# Patient Record
Sex: MALE | Race: WHITE | NOT HISPANIC OR LATINO | Employment: OTHER | URBAN - METROPOLITAN AREA
[De-identification: names, ages, dates, MRNs, and addresses within clinical notes are randomized per-mention and may not be internally consistent; named-entity substitution may affect disease eponyms.]

---

## 2023-12-27 PROBLEM — I25.10 CAD (CORONARY ARTERY DISEASE): Status: ACTIVE | Noted: 2023-12-27

## 2023-12-27 PROBLEM — I10 HTN (HYPERTENSION): Status: ACTIVE | Noted: 2023-12-27

## 2023-12-27 PROBLEM — E78.5 DYSLIPIDEMIA: Status: ACTIVE | Noted: 2023-12-27

## 2023-12-27 RX ORDER — MULTIVIT-MIN/IRON FUM/FOLIC AC 7.5 MG-4
1 TABLET ORAL DAILY
COMMUNITY

## 2023-12-27 RX ORDER — CALCIUM CARBONATE/VITAMIN D3 500-10/5ML
400 LIQUID (ML) ORAL
COMMUNITY
End: 2024-05-28 | Stop reason: WASHOUT

## 2023-12-27 RX ORDER — ASCORBIC ACID 500 MG
500 TABLET ORAL DAILY
COMMUNITY

## 2023-12-27 RX ORDER — ASPIRIN 81 MG/1
81 TABLET ORAL DAILY
COMMUNITY

## 2023-12-27 RX ORDER — ACETAMINOPHEN 500 MG
2000 TABLET ORAL
COMMUNITY
End: 2024-01-04 | Stop reason: WASHOUT

## 2023-12-27 RX ORDER — CALCIUM CARBONATE 500(1250)
1 TABLET ORAL
COMMUNITY

## 2023-12-27 RX ORDER — MULTIVITAMIN
TABLET ORAL
COMMUNITY
End: 2024-01-04 | Stop reason: WASHOUT

## 2023-12-27 RX ORDER — ATORVASTATIN CALCIUM 80 MG/1
80 TABLET, FILM COATED ORAL NIGHTLY
COMMUNITY
Start: 2019-07-17

## 2023-12-27 RX ORDER — PNV NO.95/FERROUS FUM/FOLIC AC 28MG-0.8MG
100 TABLET ORAL
COMMUNITY

## 2023-12-27 RX ORDER — METOPROLOL TARTRATE 25 MG/1
25 TABLET, FILM COATED ORAL 2 TIMES DAILY
COMMUNITY
End: 2024-01-04 | Stop reason: WASHOUT

## 2023-12-27 RX ORDER — VIT C/E/ZN/COPPR/LUTEIN/ZEAXAN 250MG-90MG
25 CAPSULE ORAL
COMMUNITY

## 2024-01-04 ENCOUNTER — OFFICE VISIT (OUTPATIENT)
Dept: CARDIOLOGY | Facility: CLINIC | Age: 74
End: 2024-01-04
Payer: MEDICARE

## 2024-01-04 VITALS
HEART RATE: 76 BPM | OXYGEN SATURATION: 97 % | DIASTOLIC BLOOD PRESSURE: 82 MMHG | BODY MASS INDEX: 45.1 KG/M2 | HEIGHT: 70 IN | SYSTOLIC BLOOD PRESSURE: 126 MMHG | WEIGHT: 315 LBS

## 2024-01-04 DIAGNOSIS — R07.89 ATYPICAL CHEST PAIN: ICD-10-CM

## 2024-01-04 DIAGNOSIS — I10 HYPERTENSION, UNSPECIFIED TYPE: ICD-10-CM

## 2024-01-04 DIAGNOSIS — E78.5 DYSLIPIDEMIA: ICD-10-CM

## 2024-01-04 DIAGNOSIS — I25.10 CORONARY ARTERY DISEASE INVOLVING NATIVE CORONARY ARTERY OF NATIVE HEART WITHOUT ANGINA PECTORIS: Primary | ICD-10-CM

## 2024-01-04 PROCEDURE — 1160F RVW MEDS BY RX/DR IN RCRD: CPT | Performed by: INTERNAL MEDICINE

## 2024-01-04 PROCEDURE — 3074F SYST BP LT 130 MM HG: CPT | Performed by: INTERNAL MEDICINE

## 2024-01-04 PROCEDURE — 1126F AMNT PAIN NOTED NONE PRSNT: CPT | Performed by: INTERNAL MEDICINE

## 2024-01-04 PROCEDURE — 1036F TOBACCO NON-USER: CPT | Performed by: INTERNAL MEDICINE

## 2024-01-04 PROCEDURE — 1159F MED LIST DOCD IN RCRD: CPT | Performed by: INTERNAL MEDICINE

## 2024-01-04 PROCEDURE — 3079F DIAST BP 80-89 MM HG: CPT | Performed by: INTERNAL MEDICINE

## 2024-01-04 PROCEDURE — 99214 OFFICE O/P EST MOD 30 MIN: CPT | Performed by: INTERNAL MEDICINE

## 2024-01-04 ASSESSMENT — ENCOUNTER SYMPTOMS
LOSS OF SENSATION IN FEET: 0
DEPRESSION: 0
OCCASIONAL FEELINGS OF UNSTEADINESS: 0

## 2024-01-04 ASSESSMENT — PAIN SCALES - GENERAL: PAINLEVEL: 0-NO PAIN

## 2024-01-04 NOTE — PROGRESS NOTES
Chief Complaint:   No chief complaint on file.     History Of Present Illness:    Paco Goss is a 73 y.o. male with a history of hypertension, dyslipidemia, abnormal ECG (inferior infarct), and CAD (PCI of RCA 7/16/19 for angina) being evaluated for routine follow-up.     Occasional episodes of chest pain that seem to happen more often at nighttime.  He sleeps in a recliner and awaken with a discomfort in the middle of the lower chest.  It goes away after several seconds.  Does not seem to occur with activity at all.    He has gained about 20 pounds back and is eating differently than he was when he was on weight watchers.  Wonders if his chest pain may be related to GERD.    Still exercises occasionally and denies any exertional chest pain or shortness of breath.     2-week ambulatory monitor February 2023: Predominantly sinus rhythm. No evidence of atrial fibrillation or flutter. Multiple episodes of SVT (likely atrial tachycardia) with the longest episode lasting 2 minutes and 10 seconds and the fastest only lasting 3 beats at a rate of 125 bpm. Second-degree type I AV block noted. Rare episodes of nonsustained VT.     Carotid duplex 12/21/2022: No evidence of significant stenosis. Less than 50% stenosis bilaterally. Performed for transient global amnesia.     Echocardiogram 12/21/2022: EF 60 to 65% with grade 1 diastolic dysfunction.     Echocardiogram 7/16/19 demonstrating vigorous LV function, EF 65-70%.     Catheterization 7/16/19 via the right radial artery. PCI to the ostial RCA with a 5.0 x 12 mm Resolute [place] CARMINA. 99% stenosis down to 0%. Mid right PLV with 80% stenosis. Decided to treat medically. The mid to distal LAD also had a 60% stenosis.      Past Medical History:  He has a past medical history of Abnormal result of other cardiovascular function study (07/29/2019), Circadian rhythm sleep disorder, unspecified type, Other forms of angina pectoris (07/29/2019), Personal history of other diseases of  "the circulatory system (07/29/2019), Personal history of other diseases of the musculoskeletal system and connective tissue, Personal history of other specified conditions (07/29/2019), and Personal history of other specified conditions (07/29/2019).    Past Surgical History:  He has a past surgical history that includes Other surgical history (07/29/2019); Other surgical history (07/29/2019); Other surgical history (07/29/2019); and Other surgical history (07/29/2019).      Social History:  He reports that he has never smoked. He has never used smokeless tobacco. He reports that he does not drink alcohol and does not use drugs.    Family History:  No family history on file.     Allergies:  Oxycodone-acetaminophen and Sulfa (sulfonamide antibiotics)    Outpatient Medications:  Current Outpatient Medications   Medication Instructions    ascorbic acid (VITAMIN C) 500 mg, oral, Daily    aspirin 81 mg, oral, Daily    atorvastatin (LIPITOR) 80 mg, oral, Nightly    calcium carbonate (Oscal) 500 mg calcium (1,250 mg) tablet 1 tablet, oral    cholecalciferol (VITAMIN D-3) 2,000 Units, oral    cholecalciferol (VITAMIN D-3) 25 mcg, oral    cyanocobalamin (VITAMIN B-12) 100 mcg, oral    magnesium oxide 400 mg, oral    metoprolol tartrate (LOPRESSOR) 25 mg, oral, 2 times daily    multivitamin (Daily Multi-Vitamin) tablet oral    multivitamin with minerals (multivit-min-iron fum-folic ac) tablet 1 tablet, oral, Daily       Last Recorded Vitals:  Visit Vitals  /82 (BP Location: Right arm, Patient Position: Sitting)   Pulse 76   Ht 1.778 m (5' 10\")   Wt 145 kg (320 lb)   SpO2 97%   BMI 45.92 kg/m²   Smoking Status Never   BSA 2.68 m²      LASTWT(3):   Wt Readings from Last 3 Encounters:   01/04/24 145 kg (320 lb)   07/18/23 141 kg (310 lb)   01/12/23 138 kg (304 lb 3.2 oz)       Physical Exam:  In general: alert and in no acute distress.   HEENT: Carotid upstrokes normal with no bruits. JVP is normal.   Pulmonary: Clear to " "auscultation bilaterally.  Cardiovascular: S1,S2, regular. No appreciable murmurs, rubs or gallops.   Lower extremities: Warm. 2+ distal pulses.  Trivial bipedal edema.     Last Labs:  CBC -  Recent Labs     08/28/20  0516 08/27/20  0529 07/16/19  0445   WBC 9.6 10.3 7.0   HGB 11.7* 11.7* 13.4*   HCT 36.7* 37.4* 42.0    202 187   MCV 90 90 90       CMP -  Recent Labs     08/28/20  0517 08/27/20  0529 07/17/19  1026 07/16/19  0445 07/15/19  1525    137 138 138 138   K 3.9 4.3 4.0 4.1 4.1    103 106 107 104   CO2 24 27 24 23 25   ANIONGAP 12 11 12 12 13   BUN 15 19 14 19 25*   CREATININE 0.69 0.79 0.65 0.66 0.76   MG  --   --  2.20 2.20 2.30     No results for input(s): \"ALBUMIN\", \"ALKPHOS\", \"ALT\", \"AST\", \"BILITOT\" in the last 33314 hours.    No lab exists for component: \"CA\"    LIPID PANEL -   Recent Labs     07/16/19  0445   CHOL 169   LDLF 82   HDL 29.0*   TRIG 292*     Lipid panel 8/24/2023: Total cholesterol 103, HDL 39, LDL 45.    No results for input(s): \"BNP\", \"HGBA1C\" in the last 75856 hours.        Assessment/Plan   1) CAD: PCI of RCA 7/16/19 for angina.  Has not had exertional chest pain but intermittent chest pain at night.  This sounds more like reflux or gastrointestinal etiology.  Asked him to try using Pepcid or Prevacid over-the-counter for couple of weeks.  If symptoms improve then no further cardiac workup needed.  If symptoms do not change then we can always consider a 2-day Lexiscan nuclear stress test.     2) dyslipidemia: Continue statin therapy     3) hypertension: Blood pressure well controlled.     4) paroxysmal SVT: No symptomatic recurrences recently.     5) follow-up: 6 months or sooner if needed.       Brant Michaud MD  "

## 2024-01-29 ENCOUNTER — HOSPITAL ENCOUNTER (OUTPATIENT)
Dept: RADIOLOGY | Facility: HOSPITAL | Age: 74
Discharge: HOME | End: 2024-01-29
Payer: MEDICARE

## 2024-01-29 DIAGNOSIS — N20.0 CALCULUS OF KIDNEY: ICD-10-CM

## 2024-01-29 PROCEDURE — 74018 RADEX ABDOMEN 1 VIEW: CPT

## 2024-04-08 ENCOUNTER — APPOINTMENT (OUTPATIENT)
Dept: ENDOCRINOLOGY | Facility: CLINIC | Age: 74
End: 2024-04-08
Payer: MEDICARE

## 2024-05-28 ENCOUNTER — OFFICE VISIT (OUTPATIENT)
Dept: CARDIOLOGY | Facility: CLINIC | Age: 74
End: 2024-05-28
Payer: MEDICARE

## 2024-05-28 VITALS
OXYGEN SATURATION: 93 % | SYSTOLIC BLOOD PRESSURE: 146 MMHG | HEART RATE: 78 BPM | HEIGHT: 70 IN | WEIGHT: 315 LBS | BODY MASS INDEX: 45.1 KG/M2 | DIASTOLIC BLOOD PRESSURE: 90 MMHG

## 2024-05-28 DIAGNOSIS — R55 POSTURAL DIZZINESS WITH PRESYNCOPE: ICD-10-CM

## 2024-05-28 DIAGNOSIS — R42 LIGHTHEADEDNESS: Primary | ICD-10-CM

## 2024-05-28 DIAGNOSIS — I10 PRIMARY HYPERTENSION: ICD-10-CM

## 2024-05-28 DIAGNOSIS — R42 POSTURAL DIZZINESS WITH PRESYNCOPE: ICD-10-CM

## 2024-05-28 PROBLEM — M10.9 ACUTE GOUT OF RIGHT FOOT: Status: ACTIVE | Noted: 2023-06-20

## 2024-05-28 PROBLEM — F17.200 NICOTINE USE DISORDER: Status: ACTIVE | Noted: 2022-09-14

## 2024-05-28 PROBLEM — M19.019 PRIMARY LOCALIZED OSTEOARTHROSIS OF SHOULDER REGION: Status: ACTIVE | Noted: 2022-03-15

## 2024-05-28 PROBLEM — G47.20 DISRUPTIONS OF 24 HOUR SLEEP-WAKE CYCLE: Status: ACTIVE | Noted: 2024-05-28

## 2024-05-28 PROBLEM — E78.2 MIXED HYPERLIPIDEMIA: Status: ACTIVE | Noted: 2023-06-20

## 2024-05-28 PROBLEM — M71.9 DISORDER OF BURSAE OF SHOULDER REGION: Status: ACTIVE | Noted: 2022-07-21

## 2024-05-28 PROBLEM — E66.01 OBESITY, MORBID, BMI 40.0-49.9 (MULTI): Status: ACTIVE | Noted: 2022-04-19

## 2024-05-28 PROBLEM — H93.12 TINNITUS OF LEFT EAR: Status: ACTIVE | Noted: 2023-06-20

## 2024-05-28 PROBLEM — R41.0 DISORIENTATION: Status: ACTIVE | Noted: 2024-05-28

## 2024-05-28 PROBLEM — D49.2 ATYPICAL SQUAMOPROLIFERATIVE SKIN LESION: Status: ACTIVE | Noted: 2023-06-20

## 2024-05-28 PROBLEM — E55.9 VITAMIN D DEFICIENCY: Status: ACTIVE | Noted: 2023-06-20

## 2024-05-28 PROBLEM — M76.60 ACHILLES BURSITIS: Status: ACTIVE | Noted: 2018-05-15

## 2024-05-28 PROBLEM — E16.2 HYPOGLYCEMIA: Status: ACTIVE | Noted: 2023-06-20

## 2024-05-28 PROBLEM — H25.89 CATARACT MATURE, TOTAL SENILE: Status: ACTIVE | Noted: 2022-04-12

## 2024-05-28 PROBLEM — N20.0 RENAL STONE: Status: ACTIVE | Noted: 2023-06-20

## 2024-05-28 PROCEDURE — 3077F SYST BP >= 140 MM HG: CPT | Performed by: NURSE PRACTITIONER

## 2024-05-28 PROCEDURE — 3080F DIAST BP >= 90 MM HG: CPT | Performed by: NURSE PRACTITIONER

## 2024-05-28 PROCEDURE — 99214 OFFICE O/P EST MOD 30 MIN: CPT | Performed by: NURSE PRACTITIONER

## 2024-05-28 PROCEDURE — 93000 ELECTROCARDIOGRAM COMPLETE: CPT | Performed by: NURSE PRACTITIONER

## 2024-05-28 PROCEDURE — 1125F AMNT PAIN NOTED PAIN PRSNT: CPT | Performed by: NURSE PRACTITIONER

## 2024-05-28 PROCEDURE — 1036F TOBACCO NON-USER: CPT | Performed by: NURSE PRACTITIONER

## 2024-05-28 PROCEDURE — 1160F RVW MEDS BY RX/DR IN RCRD: CPT | Performed by: NURSE PRACTITIONER

## 2024-05-28 PROCEDURE — 1159F MED LIST DOCD IN RCRD: CPT | Performed by: NURSE PRACTITIONER

## 2024-05-28 RX ORDER — TAMSULOSIN HYDROCHLORIDE 0.4 MG/1
0.4 CAPSULE ORAL DAILY
COMMUNITY
Start: 2022-09-16

## 2024-05-28 RX ORDER — BLOOD SUGAR DIAGNOSTIC
1 STRIP MISCELLANEOUS AS NEEDED
COMMUNITY
Start: 2024-04-26

## 2024-05-28 RX ORDER — TIZANIDINE 4 MG/1
4 TABLET ORAL EVERY 8 HOURS PRN
COMMUNITY
Start: 2023-11-27

## 2024-05-28 RX ORDER — LANCETS
1 EACH MISCELLANEOUS AS NEEDED
COMMUNITY
Start: 2024-04-26

## 2024-05-28 RX ORDER — LANCETS 30 GAUGE
1 EACH MISCELLANEOUS AS NEEDED
COMMUNITY
Start: 2024-04-29

## 2024-05-28 ASSESSMENT — PAIN SCALES - GENERAL: PAINLEVEL: 4

## 2024-05-28 NOTE — PATIENT INSTRUCTIONS
- Keep a diet log & track your daily sodium intake. No more than 2,000 mg in a day.  - Review DASH diet handout that I provided for you  - Increase exercise  - monitor blood pressure at least once a day  - call me if symptoms recur or blood pressure is running higher than >140/80 mmg. I am not opposed to getting a nuclear stress test on you    It was my pleasure to meet you. I look forward to being your cardiac Nurse Practitioner. I am a huge believer in communicating with my patients. Please contact me at any time, if anything is not clear to you regarding anything we have discussed, or if new questions occur to you.

## 2024-05-28 NOTE — PROGRESS NOTES
"Name : Paco Goss   : 1950   MRN : [id number]   ENC Date : 2024    Primary Cardiologist: Dr. Michaud     CC: lightheadedness     HPI:    Paco Goss is a 73 y.o. male with PMHx sig for hypertension, dyslipidemia, abnormal ECG (inferior infarct), and CAD s/p PCI of RCA 19 for angina who presents with his wife today with c/o lightheadedness/disorientation.    He saw Dr. Michaud in January on routine follow up. C/o occasional atypical CP at that time. Discuss about lexiscan if chest pain recurred but pain possibly related to GERD & advised to trial H2 blocker. Paco reports he did not try anything.     Now he reports he has had 3 or 4 \"events\" since January. He gets double vision, sees bright white light in his eyes, lightheadedness & feels disoriented. No chest pain, no SOB, has not gone to the hospital when this happens, has not check his BP when this happen. 2 of these occurrences were during a heated discussion with a [employer] member over financials. 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 One event was during \"excessive exercise\" walking  in the pool. The 4th event he could not remember the context.    Reports he tries to stay active doing water walking 3-4 days a week, yet BMI is ~47.    BP elevated at 146/90 mmHg today    CV Diagnostics:  2-week ambulatory monitor 2023: Predominantly sinus rhythm. No evidence of atrial fibrillation or flutter. Multiple episodes of SVT (likely atrial tachycardia) with the longest episode lasting 2 minutes and 10 seconds and the fastest only lasting 3 beats at a rate of 125 bpm. Second-degree type I AV block noted. Rare episodes of nonsustained VT.     Carotid duplex 2022: No evidence of significant stenosis. Less than 50% stenosis bilaterally. Performed for transient global amnesia.     Echocardiogram 2022: EF 60 to 65% with grade 1 diastolic dysfunction.     Echocardiogram 19 demonstrating vigorous LV function, " EF 65-70%.     Catheterization 7/16/19 via the right radial artery. PCI to the ostial RCA with a 5.0 x 12 mm Resolute [place] [address] CARMINA. 99% stenosis down to 0%. Mid right PLV with 80% stenosis. Decided to treat medically. The mid to distal LAD also had a 60% stenosis.     ROS: unless otherwise noted in the history of present illness, all other systems were reviewed and they are negative for complaints     Allergies:  Bee pollen, Cat hair standardized allergenic extract, Ciprofloxacin, Oxycodone-acetaminophen, and Sulfa (sulfonamide antibiotics)    Current Outpatient Medications   Medication Instructions    ascorbic acid (VITAMIN C) 500 mg, oral, Daily    aspirin 81 mg, oral, Daily    atorvastatin (LIPITOR) 80 mg, oral, Nightly    calcium carbonate (Oscal) 500 mg calcium (1,250 mg) tablet 1 tablet, oral    cholecalciferol (VITAMIN D-3) 25 mcg, oral    cyanocobalamin (VITAMIN B-12) 100 mcg, oral    Flomax 0.4 mg, oral, Daily    multivitamin with minerals (multivit-min-iron fum-folic ac) tablet 1 tablet, oral, Daily    OneTouch Ultra Test strip 1 strip, As needed    OneTouch Ultra2 Meter misc 1 applicator, As needed    OneTouch UltraSoft Lancets misc 1 Application, As needed    tiZANidine (ZANAFLEX) 4 mg, oral, Every 8 hours PRN        Last Labs:  CBC  Lab Results   Component Value Date    WBC 9.6 08/28/2020    HGB 11.7 (L) 08/28/2020    HCT 36.7 (L) 08/28/2020    MCV 90 08/28/2020     08/28/2020       CMP  Lab Results   Component Value Date    CALCIUM 8.6 08/28/2020       BMP   Lab Results   Component Value Date     08/28/2020    K 3.9 08/28/2020     08/28/2020    CO2 24 08/28/2020    GLUCOSE 106 (H) 08/28/2020    BUN 15 08/28/2020    CREATININE 0.69 08/28/2020       LIPID PANEL   Lab Results   Component Value Date    CHOL 169 07/16/2019    TRIG 292 (H) 07/16/2019    HDL 29.0 (A) 07/16/2019    CHHDL 5.8 (A) 07/16/2019    LDLF 82 07/16/2019    VLDL 58 (H) 07/16/2019    NHDL 140 07/16/2019       RENAL  "FUNCTION PANEL   Lab Results   Component Value Date    GLUCOSE 106 (H) 08/28/2020     08/28/2020    K 3.9 08/28/2020     08/28/2020    CO2 24 08/28/2020    ANIONGAP 12 08/28/2020    BUN 15 08/28/2020    CREATININE 0.69 08/28/2020    CALCIUM 8.6 08/28/2020        No results found for: \"BNP\", \"HGBA1C\"  I have reviewed the above labs & diagnostics    Last Recorded Vitals:  Vitals:    05/28/24 1310   BP: 146/90   BP Location: Right leg   Patient Position: Sitting   BP Cuff Size: Large adult   Pulse: 78   SpO2: 93%   Weight: 147 kg (324 lb 8 oz)   Height: 1.778 m (5' 10\")     Physical Exam:  On exam Mr. Paco Goss appears his stated age, is alert and oriented x3, and in no acute distress. His sclera are anicteric and his oropharynx has moist mucous membranes. His neck is supple and without thyromegaly. The JVP is ~5 cm of water above the right atrium. His cardiac exam has regular rhythm, normal S1, S2. No S3/4. There are no murmurs. His lungs are clear to auscultation bilaterally and there is no dullness to percussion. His abdomen is soft, nontender with normoactive bowel sounds. There is no HJR. The extremities are warm and [id number] edema. The skin is dry. There is no rash present. The distal pulses are 2-3+ in all four extremities. His mood and affect are appropriate for todays encounter.     No carotid bruits    Assessment/Plan:  1) Lightheadedness  2) Presyncopal  In the context of the episodes he can describe, I am suspicious for hypertension/hypertensive urgency. Though his vitals have never been checked in the setting. No palpitations, no prodromal symptoms. I asked him to monitor his BP at home & if it remains elevated to call me. Asked that he start watching sodium. If BP remains elevated then needs addition of anti-htn. Can consider event monitor as well by no c/o racing heart/palpitations    3) CAD: PCI of RCA 7/16/19 for angina.  No c/o chest pain. His above symptoms are not quite consistent with " anemia. Though I have a low threshold to get 2 day Lexiscan. Discussed this with Paco & he would like to hold off on lexiscan for now.     4) dyslipidemia: Continue statin therapy     5) hypertension: Blood pressure well controlled.     6) paroxysmal SVT: No symptomatic recurrences recently.     Keep July follow up with Dr. Coletta Tracy M Schwab, APRN-CNP

## 2024-06-19 DIAGNOSIS — I10 HYPERTENSION, UNSPECIFIED TYPE: ICD-10-CM

## 2024-06-19 RX ORDER — ATORVASTATIN CALCIUM 80 MG/1
80 TABLET, FILM COATED ORAL NIGHTLY
Qty: 30 TABLET | Refills: 11 | Status: SHIPPED | OUTPATIENT
Start: 2024-06-19 | End: 2025-06-19

## 2024-06-19 NOTE — TELEPHONE ENCOUNTER
Left a message for patient to call re:  for atorvastatin refill.   Did not state pharmacy, how many #30 or #90?

## 2024-07-02 RX ORDER — ATORVASTATIN CALCIUM 80 MG/1
80 TABLET, FILM COATED ORAL NIGHTLY
Qty: 90 TABLET | Refills: 1 | Status: SHIPPED | OUTPATIENT
Start: 2024-07-02 | End: 2025-07-02

## 2024-07-09 ENCOUNTER — APPOINTMENT (OUTPATIENT)
Dept: CARDIOLOGY | Facility: CLINIC | Age: 74
End: 2024-07-09
Payer: MEDICARE

## 2024-07-09 VITALS
OXYGEN SATURATION: 96 % | DIASTOLIC BLOOD PRESSURE: 80 MMHG | SYSTOLIC BLOOD PRESSURE: 140 MMHG | HEIGHT: 70 IN | HEART RATE: 75 BPM | WEIGHT: 315 LBS | BODY MASS INDEX: 45.1 KG/M2

## 2024-07-09 DIAGNOSIS — R07.89 ATYPICAL CHEST PAIN: ICD-10-CM

## 2024-07-09 DIAGNOSIS — I25.10 ATHSCL HEART DISEASE OF NATIVE CORONARY ARTERY W/O ANG PCTRS: ICD-10-CM

## 2024-07-09 DIAGNOSIS — R42 LIGHTHEADEDNESS: ICD-10-CM

## 2024-07-09 DIAGNOSIS — E78.5 DYSLIPIDEMIA: ICD-10-CM

## 2024-07-09 DIAGNOSIS — I10 BENIGN ESSENTIAL HYPERTENSION: Primary | ICD-10-CM

## 2024-07-09 DIAGNOSIS — R26.2 DIFFICULTY WALKING: ICD-10-CM

## 2024-07-09 PROBLEM — E78.2 MIXED HYPERLIPIDEMIA: Status: RESOLVED | Noted: 2023-06-20 | Resolved: 2024-07-09

## 2024-07-09 PROCEDURE — 3079F DIAST BP 80-89 MM HG: CPT | Performed by: INTERNAL MEDICINE

## 2024-07-09 PROCEDURE — 1036F TOBACCO NON-USER: CPT | Performed by: INTERNAL MEDICINE

## 2024-07-09 PROCEDURE — 3077F SYST BP >= 140 MM HG: CPT | Performed by: INTERNAL MEDICINE

## 2024-07-09 PROCEDURE — 1126F AMNT PAIN NOTED NONE PRSNT: CPT | Performed by: INTERNAL MEDICINE

## 2024-07-09 PROCEDURE — 1159F MED LIST DOCD IN RCRD: CPT | Performed by: INTERNAL MEDICINE

## 2024-07-09 PROCEDURE — 1160F RVW MEDS BY RX/DR IN RCRD: CPT | Performed by: INTERNAL MEDICINE

## 2024-07-09 PROCEDURE — 99214 OFFICE O/P EST MOD 30 MIN: CPT | Performed by: INTERNAL MEDICINE

## 2024-07-09 RX ORDER — AMINOPHYLLINE 25 MG/ML
125 INJECTION, SOLUTION INTRAVENOUS ONCE AS NEEDED
OUTPATIENT
Start: 2024-07-09

## 2024-07-09 RX ORDER — REGADENOSON 0.08 MG/ML
0.4 INJECTION, SOLUTION INTRAVENOUS
OUTPATIENT
Start: 2024-07-09

## 2024-07-09 ASSESSMENT — ENCOUNTER SYMPTOMS
OCCASIONAL FEELINGS OF UNSTEADINESS: 0
LOSS OF SENSATION IN FEET: 1
DEPRESSION: 0

## 2024-07-09 ASSESSMENT — PAIN SCALES - GENERAL: PAINLEVEL: 0-NO PAIN

## 2024-07-09 NOTE — PROGRESS NOTES
Chief Complaint:   No chief complaint on file.     History Of Present Illness:    Paco Goss is a 73 y.o. male with a history of hypertension, dyslipidemia, abnormal ECG (inferior infarct), lightheadedness, and CAD (PCI of RCA 7/16/19 for angina) being evaluated for routine follow-up.     Still complains of episodes a heavy headed feeling associated with bright white light in his eyes.  This typically occurs with activity such as working out in the pool.  Last for several minutes and then goes away.  Denies any palpitations during this time.  Denies any chest pain during this time.    Was having pain in his right calf.  Spoke to his friend who mentioned that statins can cause a symptom.  Stopped a statin a few days ago and his pain seems to improve.    Did notice some swelling in the legs.  Interestingly and improves when he walks around he gets worse when he sits still.  Has been going on over the last couple of months.    Reviewed note from Tracy Schwab May 2024.  He described 3-4 events of lightheadedness/disorientation associated with emotional distress or excessive exercising.  There was a concern for hypertensive etiology.  He was advised to follow low-sodium diet and continue to observe.  He was also asked to check his blood pressure at home.  Although he has a history of paroxysmal SVT there were no complaints of palpitations with these episodes.  Arrhythmogenic etiology was less likely.     2-week ambulatory monitor February 2023: Predominantly sinus rhythm. No evidence of atrial fibrillation or flutter. Multiple episodes of SVT (likely atrial tachycardia) with the longest episode lasting 2 minutes and 10 seconds and the fastest only lasting 3 beats at a rate of 125 bpm. Second-degree type I AV block noted. Rare episodes of nonsustained VT.     Carotid duplex 12/21/2022: No evidence of significant stenosis. Less than 50% stenosis bilaterally. Performed for transient global amnesia.     Echocardiogram  12/21/2022: EF 60 to 65% with grade 1 diastolic dysfunction.     Echocardiogram 7/16/19 demonstrating vigorous LV function, EF 65-70%.     Catheterization 7/16/19 via the right radial artery. PCI to the ostial RCA with a 5.0 x 12 mm Resolute [place] CARMINA. 99% stenosis down to 0%. Mid right PLV with 80% stenosis. Decided to treat medically. The mid to distal LAD also had a 60% stenosis.      Past Medical History:  He has a past medical history of Abnormal result of other cardiovascular function study (07/29/2019), Circadian rhythm sleep disorder, unspecified type, Other forms of angina pectoris (CMS-HCC) (07/29/2019), Personal history of other diseases of the circulatory system (07/29/2019), Personal history of other diseases of the musculoskeletal system and connective tissue, Personal history of other specified conditions (07/29/2019), and Personal history of other specified conditions (07/29/2019).    Past Surgical History:  He has a past surgical history that includes Other surgical history (07/29/2019); Other surgical history (07/29/2019); Other surgical history (07/29/2019); and Other surgical history (07/29/2019).      Social History:  He reports that he has never smoked. He has never used smokeless tobacco. He reports that he does not drink alcohol and does not use drugs.    Family History:  No family history on file.     Allergies:  Bee pollen, Cat hair standardized allergenic extract, Ciprofloxacin, Oxycodone-acetaminophen, and Sulfa (sulfonamide antibiotics)    Outpatient Medications:  Current Outpatient Medications   Medication Instructions    ascorbic acid (VITAMIN C) 500 mg, oral, Daily    aspirin 81 mg, oral, Daily    atorvastatin (LIPITOR) 80 mg, oral, Nightly    calcium carbonate (Oscal) 500 mg calcium (1,250 mg) tablet 1 tablet, oral    cholecalciferol (VITAMIN D-3) 25 mcg, oral    cyanocobalamin (VITAMIN B-12) 100 mcg, oral    Flomax 0.4 mg, oral, Daily    multivitamin with minerals (multivit-min-iron  "fum-folic ac) tablet 1 tablet, oral, Daily    OneTouch Ultra Test strip 1 strip, As needed    OneTouch Ultra2 Meter misc 1 applicator, As needed    OneTouch UltraSoft Lancets misc 1 Application, As needed       Last Recorded Vitals:  Visit Vitals  /80 (BP Location: Right arm, Patient Position: Sitting)   Pulse 75   Ht 1.778 m (5' 10\")   Wt 150 kg (330 lb)   SpO2 96%   BMI 47.35 kg/m²   Smoking Status Never   BSA 2.72 m²      LASTWT(3):   Wt Readings from Last 3 Encounters:   07/09/24 150 kg (330 lb)   05/28/24 147 kg (324 lb 8 oz)   01/04/24 145 kg (320 lb)       Physical Exam:  In general: alert and in no acute distress.   HEENT: Carotid upstrokes normal with no bruits. JVP is normal.   Pulmonary: Clear to auscultation bilaterally.  Cardiovascular: S1,S2, regular. No appreciable murmurs, rubs or gallops.   Lower extremities: Warm. 2+ distal pulses.  Trivial bipedal edema.     Last Labs:  CBC -  Recent Labs     08/28/20  0516 08/27/20  0529 07/16/19  0445   WBC 9.6 10.3 7.0   HGB 11.7* 11.7* 13.4*   HCT 36.7* 37.4* 42.0    202 187   MCV 90 90 90       CMP -  Recent Labs     08/28/20  0517 08/27/20  0529 07/17/19  1026 07/16/19  0445 07/15/19  1525    137 138 138 138   K 3.9 4.3 4.0 4.1 4.1    103 106 107 104   CO2 24 27 24 23 25   ANIONGAP 12 11 12 12 13   BUN 15 19 14 19 25*   CREATININE 0.69 0.79 0.65 0.66 0.76   MG  --   --  2.20 2.20 2.30     No results for input(s): \"ALBUMIN\", \"ALKPHOS\", \"ALT\", \"AST\", \"BILITOT\" in the last 30331 hours.    No lab exists for component: \"CA\"    LIPID PANEL -   Recent Labs     07/16/19  0445   CHOL 169   LDLF 82   HDL 29.0*   TRIG 292*     Lipid panel 6/6/2024: Total cholesterol 124, HDL 45, LDL 61, and triglycerides 95.    No results for input(s): \"BNP\", \"HGBA1C\" in the last 30282 hours.    Reviewed CBC and CMP from 6/6/2024.    Assessment/Plan   1) CAD: PCI of RCA 7/16/19 for angina.  Heavy headed feeling and bright flash in the eyes unlikely be cardiac " in etiology however is only occurring with activity.  Would like a Lexiscan nuclear stress test to rule out atypical presentation of coronary disease.    2) dyslipidemia: Want him on statin therapy for CAD.  Asked him to stay off the atorvastatin for another few days and then restart.  If his right calf pain recurs then he should stop it and we should start rosuvastatin.     3) hypertension: Blood pressure well controlled.     4) paroxysmal SVT: No symptomatic recurrences recently.  Will order an ambulatory monitor if stress testing is normal to make sure that his episodes of heavy headed feeling are not related to arrhythmia.     5) lightheadedness: See above.  If cardiac testing is negative would consider neurologic evaluation for ocular migraines.    6) follow-up: 6 months or sooner if needed.       Brant Michaud MD

## 2024-07-29 ENCOUNTER — HOSPITAL ENCOUNTER (OUTPATIENT)
Dept: RADIOLOGY | Facility: HOSPITAL | Age: 74
End: 2024-07-29
Payer: MEDICARE

## 2024-07-29 ENCOUNTER — HOSPITAL ENCOUNTER (OUTPATIENT)
Dept: CARDIOLOGY | Facility: HOSPITAL | Age: 74
Discharge: HOME | End: 2024-07-29
Payer: MEDICARE

## 2024-07-29 ENCOUNTER — HOSPITAL ENCOUNTER (OUTPATIENT)
Dept: RADIOLOGY | Facility: HOSPITAL | Age: 74
Discharge: HOME | End: 2024-07-29
Payer: MEDICARE

## 2024-07-29 ENCOUNTER — APPOINTMENT (OUTPATIENT)
Dept: CARDIOLOGY | Facility: HOSPITAL | Age: 74
End: 2024-07-29
Payer: MEDICARE

## 2024-07-29 DIAGNOSIS — R07.89 ATYPICAL CHEST PAIN: ICD-10-CM

## 2024-07-29 DIAGNOSIS — R26.2 DIFFICULTY WALKING: ICD-10-CM

## 2024-07-29 PROCEDURE — 93018 CV STRESS TEST I&R ONLY: CPT | Performed by: INTERNAL MEDICINE

## 2024-07-29 PROCEDURE — 78452 HT MUSCLE IMAGE SPECT MULT: CPT

## 2024-07-29 PROCEDURE — 93016 CV STRESS TEST SUPVJ ONLY: CPT | Performed by: INTERNAL MEDICINE

## 2024-07-29 PROCEDURE — A9502 TC99M TETROFOSMIN: HCPCS | Performed by: INTERNAL MEDICINE

## 2024-07-29 PROCEDURE — 3430000001 HC RX 343 DIAGNOSTIC RADIOPHARMACEUTICALS: Performed by: INTERNAL MEDICINE

## 2024-07-29 PROCEDURE — 93017 CV STRESS TEST TRACING ONLY: CPT

## 2024-07-30 ENCOUNTER — HOSPITAL ENCOUNTER (OUTPATIENT)
Dept: RADIOLOGY | Facility: HOSPITAL | Age: 74
Discharge: HOME | End: 2024-07-30
Payer: MEDICARE

## 2024-07-30 ENCOUNTER — TRANSCRIBE ORDERS (OUTPATIENT)
Dept: ADMINISTRATIVE | Age: 74
End: 2024-07-30

## 2024-07-30 DIAGNOSIS — M19.011 ARTHRITIS OF RIGHT SHOULDER REGION: Primary | ICD-10-CM

## 2024-07-30 PROCEDURE — A9502 TC99M TETROFOSMIN: HCPCS | Performed by: INTERNAL MEDICINE

## 2024-07-30 PROCEDURE — 3430000001 HC RX 343 DIAGNOSTIC RADIOPHARMACEUTICALS: Performed by: INTERNAL MEDICINE

## 2024-08-01 ENCOUNTER — TELEPHONE (OUTPATIENT)
Dept: CARDIOLOGY | Facility: CLINIC | Age: 74
End: 2024-08-01
Payer: MEDICARE

## 2024-08-01 NOTE — TELEPHONE ENCOUNTER
----- Message from Brant Michaud sent at 8/1/2024  9:20 AM EDT -----  Please let the patient know that his stress test looked good.  There is no evidence of any severe blockages.  This is good news.  I think we stay on his current cardiac regimen.  As I discussed in our office visit I think he might benefit from seeing a neurologist to rule out any sort of ocular migraine or any other type of syndrome which may be causing his symptoms.    No other recommendations, follow-up as scheduled.

## 2024-08-01 NOTE — TELEPHONE ENCOUNTER
Result Communication    Resulted Orders   Nuclear Stress Test    Narrative    Interpreted By:  Lianna Michaud,   STUDY:  NUCLEAR STRESS TEST;  7/30/2024 1:45 pm      INDICATION:  Signs/Symptoms:atypical chest pain, arthritis (knee pain).      COMPARISON:  None.      ACCESSION NUMBER(S):  [id number]      ORDERING CLINICIAN:  LIANNA MICHAUD      TECHNIQUE:  DIVISION OF NUCLEAR MEDICINE  PHARMACOLOGIC STRESS MYOCARDIAL PERFUSION SCAN, TWO DAY PROTOCOL      On (date), the patient received an intravenous infusion of 0.4 mg  regadenoson (Lexiscan) followed by an intravenous administration of  30 mCi of Tc-99m Myoview.  Stress phase emission tomographic (SPECT)  images of the myocardium were then acquired. These included ECG-gated  views to assess and quantify ventricular function. On (date), the  patient had received a resting administration of 30 mCi of Tc-99m  Myoview. Resting SPECT images were acquired. Low dose CT was acquired  for attenuation correction.      FINDINGS:  This is a technically fair study. The left ventricle appear normal in  size. Additionally the right ventricle appear normal size. There is  no evidence of transient ischemic dilatation (ratio 1.11). Perfusion  imaging demonstrated a small area of moderate fixed perfusion defect  in the apex which is likely due to apical thinning artifact rather  than scar. Overall systolic function was normal with EF 61%. There is  normal wall motion and thickening in all segments.        Impression    No nuclear evidence of pharmacologic stress-induced ischemia. Fixed  apical defect likely secondary to apical thinning artifact rather  than scar.      Normal LV function with normal wall motion in all segments, EF 61%.      Based on these findings low likelihood of flow-limiting coronary  artery disease.      I personally reviewed the images/study and I agree with the findings  as stated.  This study was interpreted at [facility],  [place], [place].      MACRO:  None          Signed by: Brant Michaud 8/1/2024 9:09 AM  Dictation workstation:   [id number]       10:55 AM      Results were successfully communicated with the patient and they acknowledged their understanding.

## 2024-08-05 ENCOUNTER — HOSPITAL ENCOUNTER (OUTPATIENT)
Dept: MRI IMAGING | Age: 74
Discharge: HOME | End: 2024-08-07
Payer: MEDICARE

## 2024-08-05 DIAGNOSIS — M19.011 ARTHRITIS OF RIGHT SHOULDER REGION: ICD-10-CM

## 2024-08-05 PROCEDURE — 73221 MRI JOINT UPR EXTREM W/O DYE: CPT

## 2024-09-05 ENCOUNTER — HOSPITAL ENCOUNTER (OUTPATIENT)
Dept: RADIOLOGY | Facility: HOSPITAL | Age: 74
End: 2024-09-05
Payer: MEDICARE

## 2024-09-06 NOTE — H&P (VIEW-ONLY)
Paco Goss is a 73 y.o. male  presents with chief complaint of Pre-op Exam (Rt shldr replacement with Dr. Nieto @ [facility] on 10/9/24)     SUBJECTIVE  HPI   Neurologist-November    PSA up some-5.4  Easier to urinate after BM    L shoulder starting to bother him   R cuff seems to be in place    July 2024 stress test-normal     Considering going back on Weight Watchers    RSV-told they can do if they want     Postponed CT of R shoulder    No recent episodes of dizziness, diet has relaxed     Concerned about recovery of surgery       Health Maintenance Due   Topic Date Due   • Diabetes: Hemoglobin A1C  08/30/2024   • Influenza Vaccine (1) 09/01/2024       PROBLEM LIST  SURGICAL/SOCIAL ALLERGIES:   Patient Active Problem List   Diagnosis   • Acute gout of right foot   • Atypical squamoproliferative skin lesion   • Benign prostatic hyperplasia without urinary obstruction   • Hypoglycemia   • Lumbosacral spondylosis without myelopathy   • Mixed hyperlipidemia (CMS/HCC)   • Localized osteoarthrosis   • Renal stone   • Tinnitus of left ear   • Vitamin D deficiency   • Anal fissure   • Coronary artery disease involving native coronary artery (CMS/HCC)   • Cataract mature, total senile   • Enthesopathy of hip region   • Former smoker   • Inflammation of sacroiliac joint (CMS/HCC)   • Lumbago   • Sacral pain   • Sacroiliac joint pain   • Achilles bursitis   • Cervical spondylosis without myelopathy   • Disorder of bursae of shoulder region   • Localized, primary osteoarthritis of hand   • Patellar tendonitis   • Class 3 severe obesity due to excess calories with serious comorbidity and body mass index (BMI) of 45.0 to 49.9 in adult (CMS/HCC)   • HTN (hypertension) (CMS/HCC)   • Jaw pain   • 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   • Postural dizziness with presyncope     Past Surgical History:   Procedure Laterality Date   • ADENOIDECTOMY  1949   • APPENDECTOMY  1965   • BACK SURGERY      L5-S1   • CARDIAC SURGERY  Stent 2019    • CATARACT EXTRACTION  2022    OU   • CHOLECYSTECTOMY     • JOINT REPLACEMENT      L KNEE REPLACEMENT [2015]; Rt hip replacement [2020]   • LAMINECTOMY  2022   • LIPOMA RESECTION Right     Shoulder   • VA ANES TRURL FRAGMNTJ MANJ&/RMVL URETERAL CALCULUS      Stone removal (transurethral)   • VA REMOVAL OF ANAL FISSURE  2009    Fissurectomhy/abscess   • VA TRANSCATH STENT INIT VESSEL,PERCUT  2019    CARDIAC STENT   • PROSTATE SURGERY  TERP 2017   • SPINE SURGERY  Lamenectomy w/hardware        Social History     Tobacco Use   • Smoking status: Former     Current packs/day: 0.00     Average packs/day: 0.5 packs/day for 29.0 years (14.5 ttl pk-yrs)     Types: Cigarettes, Pipe, Cigars     Start date: 1966     Quit date: 1995     Years since quittin.7   • Smokeless tobacco: Never   Vaping Use   • Vaping status: Never Used   Substance Use Topics   • Alcohol use: Yes     Comment: Occasional   • Drug use: Never       Depression: Not at risk (9/10/2024)    PHQ-2    • PHQ-2 Score: 0        Allergies   Allergen Reactions   • Bee Pollen GI intolerance   • Cat Hair Extract Swelling   • Ciprofloxacin Unknown   • Oxycodone-Acetaminophen    • Pollen Extract GI intolerance   • Sulfa Antibiotics Unknown         MEDICATIONS FAMILY HISTORY DEPRESSION SCREEN   Current Outpatient Medications   Medication Sig Dispense Refill   • ascorbic acid (Vitamin C) 100 MG chewable tablet as directed Orally     • aspirin (ASPIR) 81 MG EC tablet Aspir-81     • atorvastatin (Lipitor) 80 MG tablet 1 (one) time each day at the same time.     • Blood Glucose Monitoring Suppl (ONE TOUCH ULTRA 2) w/Device kit 1 EACH AS NEEDED FOR UP TO 1 DAY.     • cholecalciferol (Vitamin D-3) 50 MCG ( UT) capsule Vitamin D     • Cyanocobalamin (Vitamin B 12) 100 MCG lozenge Vitamin B 12     • Lancets (onetouch ultrasoft) lancets USE AS DIRECTED FOR POSSIBLE LOW BLOOD SUGAR     • magnesium oxide 400 MG capsule Take 400 mg by mouth in the  "morning.     • Misc Natural Products (Glucosamine Chond MSM Formula) tablet as directed Orally     • Multiple Vitamin (multivitamin) capsule as directed Orally     • OneTouch Ultra Test test strip 1 strip     • tamsulosin (Flomax) 0.4 MG 24 hr capsule Take 0.4 mg by mouth in the morning.       No current facility-administered medications for this visit.    Family History   Problem Relation Name Age of Onset   • Early natural death Father Sanjay    • Diabetes Father Sanjay    • No Known Problems Brother (2)    • No Known Problems Son     • No Known Problems Daughter             Depression: Not at risk (9/10/2024)    PHQ-2    • PHQ-2 Score: 0         ROS  Review of Systems   Constitutional: Negative.    HENT: Negative.     Eyes: Negative.    Respiratory: Negative.     Cardiovascular: Negative.    Gastrointestinal: Negative.    Genitourinary: Negative.    Musculoskeletal: Negative.    Skin: Negative.    Neurological: Negative.    Psychiatric/Behavioral: Negative.     Hematological: Negative.    Endocrine: Negative.    Allergic/Immunologic: Negative.            OBJECTIVE  Visit Vitals  /80   Pulse 76   Temp 97.8 °F   Resp 18   Ht 5' 10\"   Wt 332 lb 3.2 oz   SpO2 98%   BMI 47.67 kg/m²   Smoking Status Former   BSA 2.73 m²      BP Readings from Last 8 Encounters:   09/10/24 126/80   07/25/24 146/78   05/30/24 142/90   03/07/24 144/82   02/09/24 108/64   12/22/23 128/75   08/31/23 110/65   06/20/23 110/70      Wt Readings from Last 8 Encounters:   09/10/24 332 lb 3.2 oz   07/25/24 332 lb   05/30/24 321 lb   03/07/24 324 lb   02/09/24 323 lb   12/22/23 319 lb   08/31/23 (!) 311 lb   06/20/23 (!) 309 lb        Physical Exam  Vitals and nursing note reviewed.   Constitutional:       Appearance: Normal appearance.   HENT:      Head: Normocephalic and atraumatic.   Cardiovascular:      Rate and Rhythm: Normal rate and regular rhythm.   Pulmonary:      Breath sounds: Normal breath sounds.   Abdominal:      General: Abdomen " is flat. Bowel sounds are normal.      Palpations: Abdomen is soft.   Musculoskeletal:      Cervical back: Normal range of motion.   Skin:     General: Skin is warm and dry.   Neurological:      Mental Status: He is alert.          ASSESSMENT AND PLAN    Assessment/Plan  Problem List Items Addressed This Visit       Disorder of bursae of shoulder region - Primary     Right shoulder replacement 10/9/2024 with Dr. Nieto at [facility]:    CT of R shoulder scheduled for later this month  BW reviewed-good  Has tolerated anesthesia well in the past  Given requisition for BW  Discuss rehab  Quit smoking in 1993, occasional cigar now  No breathing issues in the past  Encouraged to keep bowels loose before surgery    CLEAR FOR SURGERY PENDING BW               Relevant Orders    CBC and differential    Basic metabolic panel    Protime-INR    Type and screen     Other Visit Diagnoses       Pre-op chest exam        Relevant Orders    ECG 12 lead    Preop examination        Relevant Orders    CBC and differential    Basic metabolic panel    Protime-INR    Type and screen          HPI and documentation approved and amended as necessary by Dr. Brant Smith.  Transcribed by Monisha kang.     NEXT SCHEDULED  APPT:  Visit date not found

## 2024-09-18 ENCOUNTER — HOSPITAL ENCOUNTER (OUTPATIENT)
Dept: RADIOLOGY | Facility: HOSPITAL | Age: 74
Discharge: HOME | End: 2024-09-18
Payer: MEDICARE

## 2024-09-18 DIAGNOSIS — M19.011 PRIMARY OSTEOARTHRITIS, RIGHT SHOULDER: ICD-10-CM

## 2024-09-18 PROCEDURE — 73200 CT UPPER EXTREMITY W/O DYE: CPT | Mod: RT

## 2024-09-18 PROCEDURE — 73200 CT UPPER EXTREMITY W/O DYE: CPT | Mod: RIGHT SIDE | Performed by: STUDENT IN AN ORGANIZED HEALTH CARE EDUCATION/TRAINING PROGRAM

## 2024-09-26 ENCOUNTER — LAB (OUTPATIENT)
Dept: LAB | Facility: LAB | Age: 74
End: 2024-09-26
Payer: MEDICARE

## 2024-09-26 DIAGNOSIS — Z79.899 OTHER LONG TERM (CURRENT) DRUG THERAPY: ICD-10-CM

## 2024-09-26 DIAGNOSIS — Z01.818 ENCOUNTER FOR OTHER PREPROCEDURAL EXAMINATION: ICD-10-CM

## 2024-09-26 DIAGNOSIS — Z01.818 PREOP EXAMINATION: ICD-10-CM

## 2024-09-26 DIAGNOSIS — M71.9 BURSOPATHY, UNSPECIFIED: Primary | ICD-10-CM

## 2024-09-26 LAB
ABO GROUP (TYPE) IN BLOOD: NORMAL
ANION GAP SERPL CALC-SCNC: 13 MMOL/L (ref 10–20)
ANTIBODY SCREEN: NORMAL
BASOPHILS # BLD AUTO: 0.05 X10*3/UL (ref 0–0.1)
BASOPHILS NFR BLD AUTO: 0.7 %
BUN SERPL-MCNC: 19 MG/DL (ref 6–23)
CALCIUM SERPL-MCNC: 8.7 MG/DL (ref 8.6–10.3)
CHLORIDE SERPL-SCNC: 104 MMOL/L (ref 98–107)
CO2 SERPL-SCNC: 25 MMOL/L (ref 21–32)
CREAT SERPL-MCNC: 0.72 MG/DL (ref 0.5–1.3)
EGFRCR SERPLBLD CKD-EPI 2021: >90 ML/MIN/1.73M*2
EOSINOPHIL # BLD AUTO: 0.14 X10*3/UL (ref 0–0.4)
EOSINOPHIL NFR BLD AUTO: 1.9 %
ERYTHROCYTE [DISTWIDTH] IN BLOOD BY AUTOMATED COUNT: 13.9 % (ref 11.5–14.5)
GLUCOSE SERPL-MCNC: 108 MG/DL (ref 74–99)
HCT VFR BLD AUTO: 42.4 % (ref 41–52)
HGB BLD-MCNC: 13.6 G/DL (ref 13.5–17.5)
IMM GRANULOCYTES # BLD AUTO: 0.02 X10*3/UL (ref 0–0.5)
IMM GRANULOCYTES NFR BLD AUTO: 0.3 % (ref 0–0.9)
INR PPP: 1.1 (ref 0.9–1.1)
LYMPHOCYTES # BLD AUTO: 1.39 X10*3/UL (ref 0.8–3)
LYMPHOCYTES NFR BLD AUTO: 19.3 %
MCH RBC QN AUTO: 28.5 PG (ref 26–34)
MCHC RBC AUTO-ENTMCNC: 32.1 G/DL (ref 32–36)
MCV RBC AUTO: 89 FL (ref 80–100)
MONOCYTES # BLD AUTO: 0.49 X10*3/UL (ref 0.05–0.8)
MONOCYTES NFR BLD AUTO: 6.8 %
NEUTROPHILS # BLD AUTO: 5.11 X10*3/UL (ref 1.6–5.5)
NEUTROPHILS NFR BLD AUTO: 71 %
NRBC BLD-RTO: 0 /100 WBCS (ref 0–0)
PLATELET # BLD AUTO: 214 X10*3/UL (ref 150–450)
POTASSIUM SERPL-SCNC: 4.3 MMOL/L (ref 3.5–5.3)
PROTHROMBIN TIME: 12 SECONDS (ref 9.8–12.8)
RBC # BLD AUTO: 4.77 X10*6/UL (ref 4.5–5.9)
RH FACTOR (ANTIGEN D): NORMAL
SODIUM SERPL-SCNC: 138 MMOL/L (ref 136–145)
WBC # BLD AUTO: 7.2 X10*3/UL (ref 4.4–11.3)

## 2024-09-26 PROCEDURE — 36415 COLL VENOUS BLD VENIPUNCTURE: CPT

## 2024-09-30 VITALS — WEIGHT: 315 LBS | BODY MASS INDEX: 45.1 KG/M2 | HEIGHT: 70 IN

## 2024-09-30 ASSESSMENT — PAIN - FUNCTIONAL ASSESSMENT: PAIN_FUNCTIONAL_ASSESSMENT: 0-10

## 2024-09-30 ASSESSMENT — DUKE ACTIVITY SCORE INDEX (DASI)
CAN YOU PARTICIPATE IN MODERATE RECREATIONAL ACTIVITIES LIKE GOLF, BOWLING, DANCING, DOUBLES TENNIS OR THROWING A BASEBALL OR FOOTBALL: YES
CAN YOU DO YARD WORK LIKE RAKING LEAVES, WEEDING OR PUSHING A MOWER: YES
TOTAL_SCORE: 42.2
CAN YOU DO LIGHT WORK AROUND THE HOUSE LIKE DUSTING OR WASHING DISHES: YES
CAN YOU DO HEAVY WORK AROUND THE HOUSE LIKE SCRUBBING FLOORS OR LIFTING AND MOVING HEAVY FURNITURE: NO
CAN YOU RUN A SHORT DISTANCE: NO
CAN YOU DO MODERATE WORK AROUND THE HOUSE LIKE VACUUMING, SWEEPING FLOORS OR CARRYING GROCERIES: YES
CAN YOU PARTICIPATE IN STRENOUS SPORTS LIKE SWIMMING, SINGLES TENNIS, FOOTBALL, BASKETBALL, OR SKIING: YES
CAN YOU HAVE SEXUAL RELATIONS: YES
CAN YOU WALK INDOORS, SUCH AS AROUND YOUR HOUSE: YES
DASI METS SCORE: 7.9
CAN YOU TAKE CARE OF YOURSELF (EAT, DRESS, BATHE, OR USE TOILET): YES
CAN YOU CLIMB A FLIGHT OF STAIRS OR WALK UP A HILL: YES
CAN YOU WALK A BLOCK OR TWO ON LEVEL GROUND: YES

## 2024-09-30 ASSESSMENT — LIFESTYLE VARIABLES: SMOKING_STATUS: NONSMOKER

## 2024-09-30 ASSESSMENT — ACTIVITIES OF DAILY LIVING (ADL): ADL_SCORE: 1

## 2024-09-30 ASSESSMENT — PAIN SCALES - GENERAL: PAINLEVEL_OUTOF10: 0 - NO PAIN

## 2024-10-01 ENCOUNTER — PRE-ADMISSION TESTING (OUTPATIENT)
Dept: PREADMISSION TESTING | Facility: HOSPITAL | Age: 74
End: 2024-10-01
Payer: MEDICARE

## 2024-10-01 DIAGNOSIS — Z01.818 PREOP EXAMINATION: Primary | ICD-10-CM

## 2024-10-01 PROCEDURE — 87081 CULTURE SCREEN ONLY: CPT | Mod: STJLAB

## 2024-10-01 RX ORDER — CHLORHEXIDINE GLUCONATE ORAL RINSE 1.2 MG/ML
SOLUTION DENTAL
Qty: 473 ML | Refills: 0 | Status: SHIPPED | OUTPATIENT
Start: 2024-10-01

## 2024-10-01 NOTE — PREPROCEDURE INSTRUCTIONS
Medication List            Accurate as of October 1, 2024 10:21 AM. Always use your most recent med list.                ascorbic acid 500 mg tablet  Commonly known as: Vitamin C  Additional Medication Adjustments for Surgery: Take last dose 7 days before surgery     aspirin 81 mg EC tablet  Additional Medication Adjustments for Surgery: Other (Comment)  Notes to patient: Per provider recommendations     atorvastatin 80 mg tablet  Commonly known as: Lipitor  Take 1 tablet (80 mg) by mouth once daily at bedtime.  Medication Adjustments for Surgery: Take/Use as prescribed     calcium carbonate 500 mg calcium (1,250 mg) tablet  Commonly known as: Oscal  Additional Medication Adjustments for Surgery: Take last dose 7 days before surgery     chlorhexidine 0.12 % solution  Commonly known as: Peridex  15 milliliter(s) orally once a day for 2 doses 15 ml  the night before surgery and 15 ml morning of surgery - swish for 30 seconds -DO NOT SWALLOW, SPIT OUT  Additional Medication Adjustments for Surgery: Other (Comment)  Notes to patient: As ordered     cholecalciferol 25 MCG (1000 UT) capsule  Commonly known as: Vitamin D-3  Additional Medication Adjustments for Surgery: Take last dose 7 days before surgery     cyanocobalamin 100 mcg tablet  Commonly known as: Vitamin B-12  Additional Medication Adjustments for Surgery: Take last dose 7 days before surgery     Flomax 0.4 mg 24 hr capsule  Generic drug: tamsulosin  Medication Adjustments for Surgery: Take/Use as prescribed     multivitamin with minerals tablet  Additional Medication Adjustments for Surgery: Take last dose 7 days before surgery     OneTouch Ultra Test strip  Generic drug: blood sugar diagnostic  Additional Medication Adjustments for Surgery: Other (Comment)  Notes to patient: N/A     OneTouch Ultra2 Meter misc  Generic drug: blood-glucose meter  Additional Medication Adjustments for Surgery: Other (Comment)  Notes to patient: N/A     OneTouch UltraSoft  Lancets misc  Generic drug: lancets  Additional Medication Adjustments for Surgery: Other (Comment)  Notes to patient: N/A                                PRE-OPERATIVE INSTRUCTIONS    You will receive notification one business day prior to your procedure to confirm your arrival time. It is important that you answer your phone and/or check your messages during this time. If you do not hear from the surgery center by 5 pm. the day before your procedure, please call [phone].     Please enter the building through the Outpatient entrance and take the elevator off the lobby to the 2nd floor then check in at the Outpatient Surgery desk on the 2nd floor.    INSTRUCTIONS:  Talk to your surgeon for instructions if you should stop your aspirin, blood thinner, or diabetes medicines.  DO NOT take any multivitamins or over the counter supplements for 7-10 days before surgery.  If not being admitted, you must have an adult immediately available to drive you home after surgery. We also highly recommend you have someone stay with you for the entire day and night of your surgery.  For children having surgery, a parent or legal guardian must accompany them to the surgery center. If this is not possible, please call [phone] to make additional arrangements.  For adults who are unable to consent or make medical decisions for themselves, a legal guardian or Power of  must accompany them to the surgery center. If this is not possible, please call [phone] to make additional arrangements.  Wear comfortable, loose fitting clothing.  All jewelry and piercings must be removed. If you are unable to remove an item or have a dermal piercing, please be sure to tell the nurse when you arrive for surgery.  Nail polish and make-up must be removed.  Avoid smoking or consuming alcohol for 24 hours before surgery.  To help prevent infection, please take a shower/bath and wash your hair the night before and/or morning of surgery (or  follow other specific bathing instructions provided).    Preoperative Fasting Guidelines    Why must I stop eating and drinking near surgery time?  With sedation, food or liquid in your stomach can enter your lungs causing serious complications  Increases nausea and vomiting    When do I need to stop eating and drinking before my surgery?  Do not eat any solid food after midnight the night before your surgery/procedure unless otherwise instructed by your surgeon.   You may have up to 13.5 ounces of clear liquid until TWO hours before your instructed arrival time to the hospital.  This includes water, black tea/coffee, (no milk or cream) apple juice, and electrolyte drinks (Gatorade).   You may chew gum until TWO hours before your surgery/procedure      If applicable, notify your surgeons office immediately of any new skin changes that occur to the surgical limb.      If you have any questions or concerns, please call Pre-Admission Testing at [phone].

## 2024-10-03 LAB — STAPHYLOCOCCUS SPEC CULT: NORMAL

## 2024-10-09 ENCOUNTER — HOSPITAL ENCOUNTER (OUTPATIENT)
Facility: HOSPITAL | Age: 74
Discharge: HOME | End: 2024-10-10
Attending: ORTHOPAEDIC SURGERY | Admitting: ORTHOPAEDIC SURGERY
Payer: MEDICARE

## 2024-10-09 ENCOUNTER — ANESTHESIA (OUTPATIENT)
Dept: OPERATING ROOM | Facility: HOSPITAL | Age: 74
End: 2024-10-09
Payer: MEDICARE

## 2024-10-09 ENCOUNTER — ANESTHESIA EVENT (OUTPATIENT)
Dept: OPERATING ROOM | Facility: HOSPITAL | Age: 74
End: 2024-10-09
Payer: MEDICARE

## 2024-10-09 DIAGNOSIS — M19.011 ARTHRITIS OF RIGHT SHOULDER REGION: Primary | ICD-10-CM

## 2024-10-09 PROCEDURE — 2500000004 HC RX 250 GENERAL PHARMACY W/ HCPCS (ALT 636 FOR OP/ED): Performed by: STUDENT IN AN ORGANIZED HEALTH CARE EDUCATION/TRAINING PROGRAM

## 2024-10-09 PROCEDURE — 2500000004 HC RX 250 GENERAL PHARMACY W/ HCPCS (ALT 636 FOR OP/ED): Performed by: ORTHOPAEDIC SURGERY

## 2024-10-09 PROCEDURE — 2500000005 HC RX 250 GENERAL PHARMACY W/O HCPCS: Performed by: ANESTHESIOLOGIST ASSISTANT

## 2024-10-09 PROCEDURE — 3700000002 HC GENERAL ANESTHESIA TIME - EACH INCREMENTAL 1 MINUTE: Performed by: ORTHOPAEDIC SURGERY

## 2024-10-09 PROCEDURE — 3600000004 HC OR TIME - INITIAL BASE CHARGE - PROCEDURE LEVEL FOUR: Performed by: ORTHOPAEDIC SURGERY

## 2024-10-09 PROCEDURE — 2500000004 HC RX 250 GENERAL PHARMACY W/ HCPCS (ALT 636 FOR OP/ED): Performed by: PHYSICIAN ASSISTANT

## 2024-10-09 PROCEDURE — 7100000002 HC RECOVERY ROOM TIME - EACH INCREMENTAL 1 MINUTE: Performed by: ORTHOPAEDIC SURGERY

## 2024-10-09 PROCEDURE — C1713 ANCHOR/SCREW BN/BN,TIS/BN: HCPCS | Performed by: ORTHOPAEDIC SURGERY

## 2024-10-09 PROCEDURE — 2500000002 HC RX 250 W HCPCS SELF ADMINISTERED DRUGS (ALT 637 FOR MEDICARE OP, ALT 636 FOR OP/ED): Performed by: ORTHOPAEDIC SURGERY

## 2024-10-09 PROCEDURE — 2500000001 HC RX 250 WO HCPCS SELF ADMINISTERED DRUGS (ALT 637 FOR MEDICARE OP): Performed by: ORTHOPAEDIC SURGERY

## 2024-10-09 PROCEDURE — 2500000004 HC RX 250 GENERAL PHARMACY W/ HCPCS (ALT 636 FOR OP/ED): Performed by: ANESTHESIOLOGIST ASSISTANT

## 2024-10-09 PROCEDURE — 2500000005 HC RX 250 GENERAL PHARMACY W/O HCPCS: Performed by: STUDENT IN AN ORGANIZED HEALTH CARE EDUCATION/TRAINING PROGRAM

## 2024-10-09 PROCEDURE — 2500000001 HC RX 250 WO HCPCS SELF ADMINISTERED DRUGS (ALT 637 FOR MEDICARE OP): Performed by: PHYSICIAN ASSISTANT

## 2024-10-09 PROCEDURE — 7100000011 HC EXTENDED STAY RECOVERY HOURLY - NURSING UNIT

## 2024-10-09 PROCEDURE — 3700000001 HC GENERAL ANESTHESIA TIME - INITIAL BASE CHARGE: Performed by: ORTHOPAEDIC SURGERY

## 2024-10-09 PROCEDURE — 3600000009 HC OR TIME - EACH INCREMENTAL 1 MINUTE - PROCEDURE LEVEL FOUR: Performed by: ORTHOPAEDIC SURGERY

## 2024-10-09 PROCEDURE — 2780000003 HC OR 278 NO HCPCS: Performed by: ORTHOPAEDIC SURGERY

## 2024-10-09 PROCEDURE — C1776 JOINT DEVICE (IMPLANTABLE): HCPCS | Performed by: ORTHOPAEDIC SURGERY

## 2024-10-09 PROCEDURE — 2720000007 HC OR 272 NO HCPCS: Performed by: ORTHOPAEDIC SURGERY

## 2024-10-09 PROCEDURE — C1769 GUIDE WIRE: HCPCS | Performed by: ORTHOPAEDIC SURGERY

## 2024-10-09 PROCEDURE — 7100000001 HC RECOVERY ROOM TIME - INITIAL BASE CHARGE: Performed by: ORTHOPAEDIC SURGERY

## 2024-10-09 PROCEDURE — A6213 FOAM DRG >16<=48 SQ IN W/BDR: HCPCS | Performed by: ORTHOPAEDIC SURGERY

## 2024-10-09 DEVICE — SIMPLEX® HV IS A FAST-SETTING ACRYLIC RESIN FOR USE IN BONE SURGERY. MIXING THE TWO SEPARATE STERILE COMPONENTS PRODUCES A DUCTILE BONE CEMENT WHICH, AFTER HARDENING, FIXES THE IMPLANT AND TRANSFERS STRESSES PRODUCED DURING MOVEMENT EVENLY TO THE BONE. SIMPLEX® HV CEMENT POWDER ALSO CONTAINS INSOLUBLE ZIRCONIUM DIOXIDE AS AN X-RAY CONTRAST MEDIUM. SIMPLEX® HV DOES NOT EMIT A SIGNAL AND DOES NOT POSE A SAFETY RISK IN A MAGNETIC RESONANCE ENVIRONMENT.
Type: IMPLANTABLE DEVICE | Site: SHOULDER | Status: FUNCTIONAL
Brand: SIMPLEX HV

## 2024-10-09 DEVICE — IMPLANTABLE DEVICE
Type: IMPLANTABLE DEVICE | Site: SHOULDER | Status: FUNCTIONAL
Brand: AEQUALIS™ PERFORM

## 2024-10-09 DEVICE — AEQUALIS PERFORM+ GUIDE PIN, 2.5 X 220MM: Type: IMPLANTABLE DEVICE | Site: SHOULDER | Status: NON-FUNCTIONAL

## 2024-10-09 DEVICE — GUIDE PIN, 3 X 75MM, STERILE: Type: IMPLANTABLE DEVICE | Site: SHOULDER | Status: NON-FUNCTIONAL

## 2024-10-09 RX ORDER — MEPERIDINE HYDROCHLORIDE 50 MG/ML
12.5 INJECTION INTRAMUSCULAR; INTRAVENOUS; SUBCUTANEOUS EVERY 10 MIN PRN
Status: DISCONTINUED | OUTPATIENT
Start: 2024-10-09 | End: 2024-10-09 | Stop reason: HOSPADM

## 2024-10-09 RX ORDER — CYCLOBENZAPRINE HCL 10 MG
10 TABLET ORAL 3 TIMES DAILY PRN
Status: DISCONTINUED | OUTPATIENT
Start: 2024-10-09 | End: 2024-10-10 | Stop reason: HOSPADM

## 2024-10-09 RX ORDER — LIDOCAINE HYDROCHLORIDE 20 MG/ML
INJECTION, SOLUTION INFILTRATION; PERINEURAL AS NEEDED
Status: DISCONTINUED | OUTPATIENT
Start: 2024-10-09 | End: 2024-10-09

## 2024-10-09 RX ORDER — LABETALOL HYDROCHLORIDE 5 MG/ML
5 INJECTION, SOLUTION INTRAVENOUS ONCE AS NEEDED
Status: DISCONTINUED | OUTPATIENT
Start: 2024-10-09 | End: 2024-10-09 | Stop reason: HOSPADM

## 2024-10-09 RX ORDER — TAMSULOSIN HYDROCHLORIDE 0.4 MG/1
0.4 CAPSULE ORAL NIGHTLY
Status: DISCONTINUED | OUTPATIENT
Start: 2024-10-09 | End: 2024-10-10 | Stop reason: HOSPADM

## 2024-10-09 RX ORDER — CALCIUM CARBONATE 500(1250)
1250 TABLET ORAL DAILY
Status: DISCONTINUED | OUTPATIENT
Start: 2024-10-09 | End: 2024-10-10 | Stop reason: HOSPADM

## 2024-10-09 RX ORDER — ONDANSETRON 4 MG/1
4 TABLET, FILM COATED ORAL EVERY 8 HOURS PRN
Status: DISCONTINUED | OUTPATIENT
Start: 2024-10-09 | End: 2024-10-10 | Stop reason: HOSPADM

## 2024-10-09 RX ORDER — ASCORBIC ACID 500 MG
500 TABLET ORAL DAILY
Status: DISCONTINUED | OUTPATIENT
Start: 2024-10-09 | End: 2024-10-10 | Stop reason: HOSPADM

## 2024-10-09 RX ORDER — MORPHINE SULFATE 2 MG/ML
2 INJECTION, SOLUTION INTRAMUSCULAR; INTRAVENOUS
Status: DISCONTINUED | OUTPATIENT
Start: 2024-10-09 | End: 2024-10-10 | Stop reason: HOSPADM

## 2024-10-09 RX ORDER — ALBUTEROL SULFATE 0.83 MG/ML
2.5 SOLUTION RESPIRATORY (INHALATION) ONCE AS NEEDED
Status: DISCONTINUED | OUTPATIENT
Start: 2024-10-09 | End: 2024-10-09 | Stop reason: HOSPADM

## 2024-10-09 RX ORDER — POLYETHYLENE GLYCOL 3350 17 G/17G
17 POWDER, FOR SOLUTION ORAL DAILY
Status: DISCONTINUED | OUTPATIENT
Start: 2024-10-09 | End: 2024-10-10 | Stop reason: HOSPADM

## 2024-10-09 RX ORDER — MIDAZOLAM HYDROCHLORIDE 1 MG/ML
INJECTION, SOLUTION INTRAMUSCULAR; INTRAVENOUS AS NEEDED
Status: DISCONTINUED | OUTPATIENT
Start: 2024-10-09 | End: 2024-10-09

## 2024-10-09 RX ORDER — ONDANSETRON HYDROCHLORIDE 2 MG/ML
4 INJECTION, SOLUTION INTRAVENOUS ONCE AS NEEDED
Status: DISCONTINUED | OUTPATIENT
Start: 2024-10-09 | End: 2024-10-09 | Stop reason: HOSPADM

## 2024-10-09 RX ORDER — HYDROMORPHONE HYDROCHLORIDE 1 MG/ML
INJECTION, SOLUTION INTRAMUSCULAR; INTRAVENOUS; SUBCUTANEOUS AS NEEDED
Status: DISCONTINUED | OUTPATIENT
Start: 2024-10-09 | End: 2024-10-09

## 2024-10-09 RX ORDER — ROCURONIUM BROMIDE 10 MG/ML
INJECTION, SOLUTION INTRAVENOUS AS NEEDED
Status: DISCONTINUED | OUTPATIENT
Start: 2024-10-09 | End: 2024-10-09

## 2024-10-09 RX ORDER — PROCHLORPERAZINE MALEATE 10 MG
10 TABLET ORAL EVERY 6 HOURS PRN
Status: DISCONTINUED | OUTPATIENT
Start: 2024-10-09 | End: 2024-10-10 | Stop reason: HOSPADM

## 2024-10-09 RX ORDER — OXYCODONE HYDROCHLORIDE 5 MG/1
5 TABLET ORAL EVERY 4 HOURS PRN
Status: DISCONTINUED | OUTPATIENT
Start: 2024-10-09 | End: 2024-10-09 | Stop reason: HOSPADM

## 2024-10-09 RX ORDER — ONDANSETRON HYDROCHLORIDE 2 MG/ML
INJECTION, SOLUTION INTRAVENOUS AS NEEDED
Status: DISCONTINUED | OUTPATIENT
Start: 2024-10-09 | End: 2024-10-09

## 2024-10-09 RX ORDER — ASPIRIN 81 MG/1
81 TABLET ORAL DAILY
Status: DISCONTINUED | OUTPATIENT
Start: 2024-10-09 | End: 2024-10-10 | Stop reason: HOSPADM

## 2024-10-09 RX ORDER — LIDOCAINE HYDROCHLORIDE 10 MG/ML
0.1 INJECTION, SOLUTION INFILTRATION; PERINEURAL ONCE
Status: DISCONTINUED | OUTPATIENT
Start: 2024-10-09 | End: 2024-10-09 | Stop reason: HOSPADM

## 2024-10-09 RX ORDER — ATORVASTATIN CALCIUM 80 MG/1
80 TABLET, FILM COATED ORAL NIGHTLY
Status: DISCONTINUED | OUTPATIENT
Start: 2024-10-09 | End: 2024-10-10 | Stop reason: HOSPADM

## 2024-10-09 RX ORDER — SODIUM CHLORIDE, SODIUM LACTATE, POTASSIUM CHLORIDE, CALCIUM CHLORIDE 600; 310; 30; 20 MG/100ML; MG/100ML; MG/100ML; MG/100ML
100 INJECTION, SOLUTION INTRAVENOUS CONTINUOUS
Status: DISCONTINUED | OUTPATIENT
Start: 2024-10-09 | End: 2024-10-09 | Stop reason: HOSPADM

## 2024-10-09 RX ORDER — OXYCODONE HYDROCHLORIDE 5 MG/1
5 TABLET ORAL EVERY 4 HOURS PRN
Status: DISCONTINUED | OUTPATIENT
Start: 2024-10-09 | End: 2024-10-10 | Stop reason: HOSPADM

## 2024-10-09 RX ORDER — DOCUSATE SODIUM 100 MG/1
100 CAPSULE, LIQUID FILLED ORAL 2 TIMES DAILY
Status: DISCONTINUED | OUTPATIENT
Start: 2024-10-09 | End: 2024-10-10 | Stop reason: HOSPADM

## 2024-10-09 RX ORDER — PHENYLEPHRINE 10 MG/250 ML(40 MCG/ML)IN 0.9 % SOD.CHLORIDE INTRAVENOUS
CONTINUOUS PRN
Status: DISCONTINUED | OUTPATIENT
Start: 2024-10-09 | End: 2024-10-09

## 2024-10-09 RX ORDER — BISACODYL 5 MG
10 TABLET, DELAYED RELEASE (ENTERIC COATED) ORAL DAILY PRN
Status: DISCONTINUED | OUTPATIENT
Start: 2024-10-09 | End: 2024-10-10 | Stop reason: HOSPADM

## 2024-10-09 RX ORDER — PROPOFOL 10 MG/ML
INJECTION, EMULSION INTRAVENOUS AS NEEDED
Status: DISCONTINUED | OUTPATIENT
Start: 2024-10-09 | End: 2024-10-09

## 2024-10-09 RX ORDER — ONDANSETRON HYDROCHLORIDE 2 MG/ML
4 INJECTION, SOLUTION INTRAVENOUS EVERY 8 HOURS PRN
Status: DISCONTINUED | OUTPATIENT
Start: 2024-10-09 | End: 2024-10-10 | Stop reason: HOSPADM

## 2024-10-09 RX ORDER — ACETAMINOPHEN 325 MG/1
650 TABLET ORAL EVERY 6 HOURS SCHEDULED
Status: DISCONTINUED | OUTPATIENT
Start: 2024-10-09 | End: 2024-10-10 | Stop reason: HOSPADM

## 2024-10-09 RX ORDER — PROCHLORPERAZINE EDISYLATE 5 MG/ML
10 INJECTION INTRAMUSCULAR; INTRAVENOUS EVERY 6 HOURS PRN
Status: DISCONTINUED | OUTPATIENT
Start: 2024-10-09 | End: 2024-10-10 | Stop reason: HOSPADM

## 2024-10-09 RX ORDER — PROCHLORPERAZINE 25 MG/1
25 SUPPOSITORY RECTAL EVERY 12 HOURS PRN
Status: DISCONTINUED | OUTPATIENT
Start: 2024-10-09 | End: 2024-10-10 | Stop reason: HOSPADM

## 2024-10-09 RX ORDER — PNV NO.95/FERROUS FUM/FOLIC AC 28MG-0.8MG
100 TABLET ORAL DAILY
Status: DISCONTINUED | OUTPATIENT
Start: 2024-10-09 | End: 2024-10-10 | Stop reason: HOSPADM

## 2024-10-09 RX ORDER — OXYCODONE HYDROCHLORIDE 10 MG/1
10 TABLET ORAL EVERY 4 HOURS PRN
Status: DISCONTINUED | OUTPATIENT
Start: 2024-10-09 | End: 2024-10-10 | Stop reason: HOSPADM

## 2024-10-09 RX ORDER — SODIUM CHLORIDE, SODIUM LACTATE, POTASSIUM CHLORIDE, CALCIUM CHLORIDE 600; 310; 30; 20 MG/100ML; MG/100ML; MG/100ML; MG/100ML
INJECTION, SOLUTION INTRAVENOUS CONTINUOUS PRN
Status: DISCONTINUED | OUTPATIENT
Start: 2024-10-09 | End: 2024-10-09

## 2024-10-09 RX ORDER — SODIUM CHLORIDE, SODIUM LACTATE, POTASSIUM CHLORIDE, CALCIUM CHLORIDE 600; 310; 30; 20 MG/100ML; MG/100ML; MG/100ML; MG/100ML
50 INJECTION, SOLUTION INTRAVENOUS CONTINUOUS
Status: DISCONTINUED | OUTPATIENT
Start: 2024-10-09 | End: 2024-10-10 | Stop reason: HOSPADM

## 2024-10-09 RX ORDER — FENTANYL CITRATE 50 UG/ML
25 INJECTION, SOLUTION INTRAMUSCULAR; INTRAVENOUS EVERY 5 MIN PRN
Status: DISCONTINUED | OUTPATIENT
Start: 2024-10-09 | End: 2024-10-09 | Stop reason: HOSPADM

## 2024-10-09 RX ORDER — PHENYLEPHRINE HCL IN 0.9% NACL 1 MG/10 ML
SYRINGE (ML) INTRAVENOUS AS NEEDED
Status: DISCONTINUED | OUTPATIENT
Start: 2024-10-09 | End: 2024-10-09

## 2024-10-09 RX ORDER — CEFAZOLIN SODIUM 2 G/100ML
2 INJECTION, SOLUTION INTRAVENOUS ONCE
Status: DISCONTINUED | OUTPATIENT
Start: 2024-10-09 | End: 2024-10-09

## 2024-10-09 RX ORDER — MIDAZOLAM HYDROCHLORIDE 1 MG/ML
1 INJECTION, SOLUTION INTRAMUSCULAR; INTRAVENOUS ONCE AS NEEDED
Status: DISCONTINUED | OUTPATIENT
Start: 2024-10-09 | End: 2024-10-09 | Stop reason: HOSPADM

## 2024-10-09 RX ADMIN — DEXTROSE MONOHYDRATE 3 G: 5 INJECTION INTRAVENOUS at 16:01

## 2024-10-09 RX ADMIN — Medication 10 L/MIN: at 10:57

## 2024-10-09 RX ADMIN — POLYETHYLENE GLYCOL 3350 17 G: 17 POWDER, FOR SOLUTION ORAL at 17:37

## 2024-10-09 RX ADMIN — CYCLOBENZAPRINE 10 MG: 10 TABLET, FILM COATED ORAL at 21:20

## 2024-10-09 RX ADMIN — FENTANYL CITRATE 25 MCG: 50 INJECTION, SOLUTION INTRAMUSCULAR; INTRAVENOUS at 11:41

## 2024-10-09 RX ADMIN — HYDROMORPHONE HYDROCHLORIDE 0.5 MG: 1 INJECTION, SOLUTION INTRAMUSCULAR; INTRAVENOUS; SUBCUTANEOUS at 11:21

## 2024-10-09 RX ADMIN — DOCUSATE SODIUM 100 MG: 100 CAPSULE, LIQUID FILLED ORAL at 21:20

## 2024-10-09 RX ADMIN — SODIUM CHLORIDE, POTASSIUM CHLORIDE, SODIUM LACTATE AND CALCIUM CHLORIDE 50 ML/HR: 600; 310; 30; 20 INJECTION, SOLUTION INTRAVENOUS at 16:01

## 2024-10-09 RX ADMIN — ACETAMINOPHEN 325MG 650 MG: 325 TABLET ORAL at 17:37

## 2024-10-09 RX ADMIN — TAMSULOSIN HYDROCHLORIDE 0.4 MG: 0.4 CAPSULE ORAL at 21:20

## 2024-10-09 RX ADMIN — OXYCODONE HYDROCHLORIDE 5 MG: 5 TABLET ORAL at 17:43

## 2024-10-09 RX ADMIN — ATORVASTATIN CALCIUM 80 MG: 80 TABLET, FILM COATED ORAL at 21:20

## 2024-10-09 RX ADMIN — OXYCODONE HYDROCHLORIDE 5 MG: 5 TABLET ORAL at 22:20

## 2024-10-09 RX ADMIN — ASPIRIN 81 MG: 81 TABLET, COATED ORAL at 16:01

## 2024-10-09 SDOH — ECONOMIC STABILITY: INCOME INSECURITY: IN THE PAST 12 MONTHS, HAS THE ELECTRIC, GAS, OIL, OR WATER COMPANY THREATENED TO SHUT OFF SERVICE IN YOUR HOME?: NO

## 2024-10-09 SDOH — SOCIAL STABILITY: SOCIAL INSECURITY: DOES ANYONE TRY TO KEEP YOU FROM HAVING/CONTACTING OTHER FRIENDS OR DOING THINGS OUTSIDE YOUR HOME?: NO

## 2024-10-09 SDOH — SOCIAL STABILITY: SOCIAL INSECURITY: WITHIN THE LAST YEAR, HAVE YOU BEEN HUMILIATED OR EMOTIONALLY ABUSED IN OTHER WAYS BY YOUR PARTNER OR EX-PARTNER?: NO

## 2024-10-09 SDOH — HEALTH STABILITY: MENTAL HEALTH: CURRENT SMOKER: 0

## 2024-10-09 SDOH — ECONOMIC STABILITY: INCOME INSECURITY: IN THE PAST 12 MONTHS HAS THE ELECTRIC, GAS, OIL, OR WATER COMPANY THREATENED TO SHUT OFF SERVICES IN YOUR HOME?: NO

## 2024-10-09 SDOH — ECONOMIC STABILITY: HOUSING INSECURITY: AT ANY TIME IN THE PAST 12 MONTHS, WERE YOU HOMELESS OR LIVING IN A SHELTER (INCLUDING NOW)?: NO

## 2024-10-09 SDOH — SOCIAL STABILITY: SOCIAL INSECURITY: ARE YOU OR HAVE YOU BEEN THREATENED OR ABUSED PHYSICALLY, EMOTIONALLY, OR SEXUALLY BY ANYONE?: NO

## 2024-10-09 SDOH — SOCIAL STABILITY: SOCIAL INSECURITY
WITHIN THE LAST YEAR, HAVE YOU BEEN RAPED OR FORCED TO HAVE ANY KIND OF SEXUAL ACTIVITY BY YOUR PARTNER OR EX-PARTNER?: NO

## 2024-10-09 SDOH — SOCIAL STABILITY: SOCIAL INSECURITY
WITHIN THE LAST YEAR, HAVE YOU BEEN KICKED, HIT, SLAPPED, OR OTHERWISE PHYSICALLY HURT BY YOUR PARTNER OR EX-PARTNER?: NO

## 2024-10-09 SDOH — SOCIAL STABILITY: SOCIAL INSECURITY: HAVE YOU HAD ANY THOUGHTS OF HARMING ANYONE ELSE?: NO

## 2024-10-09 SDOH — SOCIAL STABILITY: SOCIAL INSECURITY: WITHIN THE LAST YEAR, HAVE YOU BEEN AFRAID OF YOUR PARTNER OR EX-PARTNER?: NO

## 2024-10-09 SDOH — ECONOMIC STABILITY: TRANSPORTATION INSECURITY
IN THE PAST 12 MONTHS, HAS LACK OF TRANSPORTATION KEPT YOU FROM MEETINGS, WORK, OR FROM GETTING THINGS NEEDED FOR DAILY LIVING?: NO

## 2024-10-09 SDOH — ECONOMIC STABILITY: HOUSING INSECURITY: IN THE PAST 12 MONTHS, HOW MANY TIMES HAVE YOU MOVED WHERE YOU WERE LIVING?: 1

## 2024-10-09 SDOH — ECONOMIC STABILITY: TRANSPORTATION INSECURITY
IN THE PAST 12 MONTHS, HAS THE LACK OF TRANSPORTATION KEPT YOU FROM MEDICAL APPOINTMENTS OR FROM GETTING MEDICATIONS?: NO

## 2024-10-09 SDOH — ECONOMIC STABILITY: FOOD INSECURITY: WITHIN THE PAST 12 MONTHS, YOU WORRIED THAT YOUR FOOD WOULD RUN OUT BEFORE YOU GOT THE MONEY TO BUY MORE.: NEVER TRUE

## 2024-10-09 SDOH — ECONOMIC STABILITY: INCOME INSECURITY: IN THE LAST 12 MONTHS, WAS THERE A TIME WHEN YOU WERE NOT ABLE TO PAY THE MORTGAGE OR RENT ON TIME?: NO

## 2024-10-09 SDOH — SOCIAL STABILITY: SOCIAL INSECURITY: DO YOU FEEL ANYONE HAS EXPLOITED OR TAKEN ADVANTAGE OF YOU FINANCIALLY OR OF YOUR PERSONAL PROPERTY?: NO

## 2024-10-09 SDOH — ECONOMIC STABILITY: FOOD INSECURITY: WITHIN THE PAST 12 MONTHS, THE FOOD YOU BOUGHT JUST DIDN'T LAST AND YOU DIDN'T HAVE MONEY TO GET MORE.: NEVER TRUE

## 2024-10-09 SDOH — ECONOMIC STABILITY: FOOD INSECURITY: WITHIN THE PAST 12 MONTHS, YOU WORRIED THAT YOUR FOOD WOULD RUN OUT BEFORE YOU GOT MONEY TO BUY MORE.: NEVER TRUE

## 2024-10-09 SDOH — ECONOMIC STABILITY: TRANSPORTATION INSECURITY: IN THE PAST 12 MONTHS, HAS LACK OF TRANSPORTATION KEPT YOU FROM MEDICAL APPOINTMENTS OR FROM GETTING MEDICATIONS?: NO

## 2024-10-09 SDOH — ECONOMIC STABILITY: HOUSING INSECURITY: IN THE LAST 12 MONTHS, WAS THERE A TIME WHEN YOU WERE NOT ABLE TO PAY THE MORTGAGE OR RENT ON TIME?: NO

## 2024-10-09 SDOH — SOCIAL STABILITY: SOCIAL INSECURITY: ABUSE: ADULT

## 2024-10-09 SDOH — SOCIAL STABILITY: SOCIAL INSECURITY: HAVE YOU HAD THOUGHTS OF HARMING ANYONE ELSE?: NO

## 2024-10-09 SDOH — SOCIAL STABILITY: SOCIAL INSECURITY: WERE YOU ABLE TO COMPLETE ALL THE BEHAVIORAL HEALTH SCREENINGS?: YES

## 2024-10-09 SDOH — SOCIAL STABILITY: SOCIAL INSECURITY
WITHIN THE LAST YEAR, HAVE TO BEEN RAPED OR FORCED TO HAVE ANY KIND OF SEXUAL ACTIVITY BY YOUR PARTNER OR EX-PARTNER?: NO

## 2024-10-09 SDOH — ECONOMIC STABILITY: FOOD INSECURITY: HOW HARD IS IT FOR YOU TO PAY FOR THE VERY BASICS LIKE FOOD, HOUSING, MEDICAL CARE, AND HEATING?: NOT HARD AT ALL

## 2024-10-09 SDOH — ECONOMIC STABILITY: INCOME INSECURITY: HOW HARD IS IT FOR YOU TO PAY FOR THE VERY BASICS LIKE FOOD, HOUSING, MEDICAL CARE, AND HEATING?: NOT HARD AT ALL

## 2024-10-09 SDOH — SOCIAL STABILITY: SOCIAL INSECURITY: ARE THERE ANY APPARENT SIGNS OF INJURIES/BEHAVIORS THAT COULD BE RELATED TO ABUSE/NEGLECT?: NO

## 2024-10-09 SDOH — SOCIAL STABILITY: SOCIAL INSECURITY: HAS ANYONE EVER THREATENED TO HURT YOUR FAMILY OR YOUR PETS?: NO

## 2024-10-09 SDOH — SOCIAL STABILITY: SOCIAL INSECURITY: DO YOU FEEL UNSAFE GOING BACK TO THE PLACE WHERE YOU ARE LIVING?: NO

## 2024-10-09 ASSESSMENT — ACTIVITIES OF DAILY LIVING (ADL)
DRESSING YOURSELF: INDEPENDENT
PATIENT'S MEMORY ADEQUATE TO SAFELY COMPLETE DAILY ACTIVITIES?: YES
JUDGMENT_ADEQUATE_SAFELY_COMPLETE_DAILY_ACTIVITIES: YES
HEARING - RIGHT EAR: FUNCTIONAL
WALKS IN HOME: INDEPENDENT
JUDGMENT_ADEQUATE_SAFELY_COMPLETE_DAILY_ACTIVITIES: YES
BATHING: INDEPENDENT
DRESSING YOURSELF: INDEPENDENT
GROOMING: INDEPENDENT
BATHING: INDEPENDENT
PATIENT'S MEMORY ADEQUATE TO SAFELY COMPLETE DAILY ACTIVITIES?: YES
ADEQUATE_TO_COMPLETE_ADL: YES
LACK_OF_TRANSPORTATION: NO
GROOMING: INDEPENDENT
LACK_OF_TRANSPORTATION: NO
HEARING - LEFT EAR: FUNCTIONAL
TOILETING: INDEPENDENT
FEEDING YOURSELF: INDEPENDENT
HEARING - LEFT EAR: FUNCTIONAL
TOILETING: INDEPENDENT
FEEDING YOURSELF: INDEPENDENT
HEARING - RIGHT EAR: FUNCTIONAL
ADEQUATE_TO_COMPLETE_ADL: YES

## 2024-10-09 ASSESSMENT — COGNITIVE AND FUNCTIONAL STATUS - GENERAL
PATIENT BASELINE BEDBOUND: NO
TOILETING: A LOT
DRESSING REGULAR UPPER BODY CLOTHING: A LOT
MOVING TO AND FROM BED TO CHAIR: A LOT
HELP NEEDED FOR BATHING: A LOT
WALKING IN HOSPITAL ROOM: A LOT
DAILY ACTIVITIY SCORE: 13
TURNING FROM BACK TO SIDE WHILE IN FLAT BAD: A LOT
CLIMB 3 TO 5 STEPS WITH RAILING: A LOT
PERSONAL GROOMING: A LOT
STANDING UP FROM CHAIR USING ARMS: A LOT
DRESSING REGULAR LOWER BODY CLOTHING: A LOT
MOBILITY SCORE: 13
MOVING FROM LYING ON BACK TO SITTING ON SIDE OF FLAT BED WITH BEDRAILS: A LITTLE
EATING MEALS: A LITTLE

## 2024-10-09 ASSESSMENT — PAIN SCALES - GENERAL
PAINLEVEL_OUTOF10: 3
PAINLEVEL_OUTOF10: 7
PAINLEVEL_OUTOF10: 5 - MODERATE PAIN
PAINLEVEL_OUTOF10: 4
PAINLEVEL_OUTOF10: 6
PAINLEVEL_OUTOF10: 5 - MODERATE PAIN
PAINLEVEL_OUTOF10: 4
PAINLEVEL_OUTOF10: 7
PAIN_LEVEL: 0
PAINLEVEL_OUTOF10: 4
PAINLEVEL_OUTOF10: 4

## 2024-10-09 ASSESSMENT — PAIN - FUNCTIONAL ASSESSMENT
PAIN_FUNCTIONAL_ASSESSMENT: 0-10
PAIN_FUNCTIONAL_ASSESSMENT: UNABLE TO SELF-REPORT
PAIN_FUNCTIONAL_ASSESSMENT: 0-10
PAIN_FUNCTIONAL_ASSESSMENT: UNABLE TO SELF-REPORT
PAIN_FUNCTIONAL_ASSESSMENT: 0-10

## 2024-10-09 ASSESSMENT — PAIN SCALES - PAIN ASSESSMENT IN ADVANCED DEMENTIA (PAINAD)
BODYLANGUAGE: RELAXED
BREATHING: NORMAL
FACIALEXPRESSION: SMILING OR INEXPRESSIVE
TOTALSCORE: MEDICATION (SEE MAR);COLD APPLIED
CONSOLABILITY: NO NEED TO CONSOLE
TOTALSCORE: 0

## 2024-10-09 ASSESSMENT — LIFESTYLE VARIABLES
HOW OFTEN DO YOU HAVE A DRINK CONTAINING ALCOHOL: NEVER
SKIP TO QUESTIONS 9-10: 1
HOW OFTEN DO YOU HAVE 6 OR MORE DRINKS ON ONE OCCASION: NEVER
HOW MANY STANDARD DRINKS CONTAINING ALCOHOL DO YOU HAVE ON A TYPICAL DAY: PATIENT DOES NOT DRINK
AUDIT-C TOTAL SCORE: 0
AUDIT-C TOTAL SCORE: 0

## 2024-10-09 ASSESSMENT — PATIENT HEALTH QUESTIONNAIRE - PHQ9
SUM OF ALL RESPONSES TO PHQ9 QUESTIONS 1 & 2: 0
2. FEELING DOWN, DEPRESSED OR HOPELESS: NOT AT ALL
1. LITTLE INTEREST OR PLEASURE IN DOING THINGS: NOT AT ALL

## 2024-10-09 ASSESSMENT — COLUMBIA-SUICIDE SEVERITY RATING SCALE - C-SSRS
1. IN THE PAST MONTH, HAVE YOU WISHED YOU WERE DEAD OR WISHED YOU COULD GO TO SLEEP AND NOT WAKE UP?: NO
2. HAVE YOU ACTUALLY HAD ANY THOUGHTS OF KILLING YOURSELF?: NO
6. HAVE YOU EVER DONE ANYTHING, STARTED TO DO ANYTHING, OR PREPARED TO DO ANYTHING TO END YOUR LIFE?: NO

## 2024-10-09 ASSESSMENT — PAIN DESCRIPTION - ORIENTATION: ORIENTATION: RIGHT

## 2024-10-09 ASSESSMENT — PAIN DESCRIPTION - LOCATION: LOCATION: SHOULDER

## 2024-10-09 ASSESSMENT — PAIN SCALES - WONG BAKER: WONGBAKER_NUMERICALRESPONSE: HURTS LITTLE MORE

## 2024-10-09 NOTE — ANESTHESIA PREPROCEDURE EVALUATION
Patient: Paco Goss    Procedure Information       Date/Time: 10/09/24 0735    Procedure: Right anatomic total shoulder arthroplasty versus possible reverse shoulder arthroplasty. (Right: Shoulder)    Location: [facility] OR 09 / Virtual [facility] OR    Surgeons: Jhony Nieto MD          Vitals:    10/09/24 0645   BP: 149/72   Pulse: 71   Resp: 17   Temp: 36.2 °C (97.2 °F)   SpO2: 97%     Vitals:    10/09/24 0645   BP: 149/72   Pulse: 71   Resp: 17   Temp: 36.2 °C (97.2 °F)   SpO2: 97%       Past Surgical History:   Procedure Laterality Date   • BACK SURGERY     • OTHER SURGICAL HISTORY  07/29/2019    Appendectomy   • OTHER SURGICAL HISTORY  07/29/2019    Angioplasty, WITH STENT   • OTHER SURGICAL HISTORY  07/29/2019    Tonsillectomy   • OTHER SURGICAL HISTORY  07/29/2019    Gallbladder surgery   • TOTAL HIP ARTHROPLASTY Right 2020   • TOTAL KNEE ARTHROPLASTY Left 2015     Past Medical History:   Diagnosis Date   • Abnormal result of other cardiovascular function study 07/29/2019    Abnormal stress test   • BPH (benign prostatic hyperplasia)    • Circadian rhythm sleep disorder, unspecified type     Disturbed sleep rhythm   • Coronary artery disease    • Gout     HISTORY   • Hyperlipidemia    • Hypertension    • Hypoglycemia     HISTORY   • Kidney stones     HISTORY   • OA (osteoarthritis)    • Other forms of angina pectoris 07/29/2019    Angina at rest   • Personal history of other diseases of the circulatory system 07/29/2019    History of abnormal electrocardiography   • Personal history of other diseases of the musculoskeletal system and connective tissue     History of chronic back pain   • Personal history of other specified conditions 07/29/2019    History of shortness of breath   • Personal history of other specified conditions 07/29/2019    History of dizziness     No current facility-administered medications for this encounter.    Facility-Administered Medications Ordered in Other Encounters:   •  midazolam (Versed)  injection, , intravenous, PRN, Sylvester Silver DO, 2 mg at 10/09/24 0742  •  propofol (Diprivan) injection, , intravenous, PRN, CAYLA Esparza, 200 mg at 10/09/24 0758  Prior to Admission medications    Medication Sig Start Date End Date Taking? Authorizing Provider   ascorbic acid (Vitamin C) 500 mg tablet Take 1 tablet (500 mg) by mouth once daily.   Yes Historical Provider, MD   aspirin 81 mg EC tablet Take 1 tablet (81 mg) by mouth once daily.   Yes Historical Provider, MD   atorvastatin (Lipitor) 80 mg tablet Take 1 tablet (80 mg) by mouth once daily at bedtime. 7/2/24 7/2/25 Yes Hadley Odom MD   calcium carbonate (Oscal) 500 mg calcium (1,250 mg) tablet Take 1 tablet (1,250 mg) by mouth once daily.   Yes Historical Provider, MD   chlorhexidine (Peridex) 0.12 % solution 15 milliliter(s) orally once a day for 2 doses 15 ml  the night before surgery and 15 ml morning of surgery - swish for 30 seconds -DO NOT SWALLOW, SPIT OUT 10/1/24  Yes Donita Segovia, APRN-CNP   cholecalciferol (Vitamin D-3) 25 MCG (1000 UT) capsule Take 1 capsule (25 mcg) by mouth once daily.   Yes Historical Provider, MD   cyanocobalamin (Vitamin B-12) 100 mcg tablet Take 1 tablet (100 mcg) by mouth once daily.   Yes Historical Provider, MD   Flomax 0.4 mg 24 hr capsule Take 1 capsule (0.4 mg) by mouth once daily at bedtime. 9/16/22  Yes Historical Provider, MD   multivitamin with minerals (multivit-min-iron fum-folic ac) tablet Take 1 tablet by mouth once daily.   Yes Historical Provider, MD   OneTouch Ultra Test strip 1 strip if needed. 4/26/24   Historical Provider, MD   OneTouch Ultra2 Meter misc 1 applicator if needed. 4/29/24   Historical Provider, MD   OneTouch UltraSoft Lancets misc 1 Application if needed. 4/26/24   Historical Provider, MD     Allergies   Allergen Reactions   • Cat Hair Standardized Allergenic Extract Swelling   • Ciprofloxacin Itching   • Oxycodone Constipation   • Sulfa (Sulfonamide Antibiotics)  Itching     Social History     Tobacco Use   • Smoking status: Former     Current packs/day: 0.00     Types: Cigarettes     Quit date:      Years since quittin.7   • Smokeless tobacco: Never   Substance Use Topics   • Alcohol use: Yes     Comment: OCCASIONAL         Chemistry    Lab Results   Component Value Date/Time     2024 0741    K 4.3 2024 0741     2024 0741    CO2 25 2024 0741    BUN 19 2024 0741    CREATININE 0.72 2024 0741    Lab Results   Component Value Date/Time    CALCIUM 8.7 2024 0741          Lab Results   Component Value Date/Time    WBC 7.2 2024 0741    HGB 13.6 2024 0741    HCT 42.4 2024 0741     2024 0741     Lab Results   Component Value Date/Time    PROTIME 12.0 2024 0741    INR 1.1 202441     Encounter Date: 24   ECG 12 lead (Clinic Performed)    Narrative    Sinus Rhythm, HR 73 bpm        Past Surgical History:   Procedure Laterality Date   • BACK SURGERY     • OTHER SURGICAL HISTORY  2019    Appendectomy   • OTHER SURGICAL HISTORY  2019    Angioplasty, WITH STENT   • OTHER SURGICAL HISTORY  2019    Tonsillectomy   • OTHER SURGICAL HISTORY  2019    Gallbladder surgery   • TOTAL HIP ARTHROPLASTY Right    • TOTAL KNEE ARTHROPLASTY Left      Past Medical History:   Diagnosis Date   • Abnormal result of other cardiovascular function study 2019    Abnormal stress test   • BPH (benign prostatic hyperplasia)    • Circadian rhythm sleep disorder, unspecified type     Disturbed sleep rhythm   • Coronary artery disease    • Gout     HISTORY   • Hyperlipidemia    • Hypertension    • Hypoglycemia     HISTORY   • Kidney stones     HISTORY   • OA (osteoarthritis)    • Other forms of angina pectoris 2019    Angina at rest   • Personal history of other diseases of the circulatory system 2019    History of abnormal electrocardiography   • Personal  history of other diseases of the musculoskeletal system and connective tissue     History of chronic back pain   • Personal history of other specified conditions 07/29/2019    History of shortness of breath   • Personal history of other specified conditions 07/29/2019    History of dizziness       Current Facility-Administered Medications:   •  ceFAZolin (Ancef) 3 g in dextrose 5%  mL, 3 g, intravenous, Once, Jhony Nieto MD  Prior to Admission medications    Medication Sig Start Date End Date Taking? Authorizing Provider   ascorbic acid (Vitamin C) 500 mg tablet Take 1 tablet (500 mg) by mouth once daily.   Yes Historical Provider, MD   aspirin 81 mg EC tablet Take 1 tablet (81 mg) by mouth once daily.   Yes Historical Provider, MD   atorvastatin (Lipitor) 80 mg tablet Take 1 tablet (80 mg) by mouth once daily at bedtime. 7/2/24 7/2/25 Yes Hadley Odom MD   calcium carbonate (Oscal) 500 mg calcium (1,250 mg) tablet Take 1 tablet (1,250 mg) by mouth once daily.   Yes Historical Provider, MD   chlorhexidine (Peridex) 0.12 % solution 15 milliliter(s) orally once a day for 2 doses 15 ml  the night before surgery and 15 ml morning of surgery - swish for 30 seconds -DO NOT SWALLOW, SPIT OUT 10/1/24  Yes JOYCE Montesinos-CNP   cholecalciferol (Vitamin D-3) 25 MCG (1000 UT) capsule Take 1 capsule (25 mcg) by mouth once daily.   Yes Historical Provider, MD   cyanocobalamin (Vitamin B-12) 100 mcg tablet Take 1 tablet (100 mcg) by mouth once daily.   Yes Historical Provider, MD   Flomax 0.4 mg 24 hr capsule Take 1 capsule (0.4 mg) by mouth once daily at bedtime. 9/16/22  Yes Historical Provider, MD   multivitamin with minerals (multivit-min-iron fum-folic ac) tablet Take 1 tablet by mouth once daily.   Yes Historical Provider, MD   OneTouch Ultra Test strip 1 strip if needed. 4/26/24   Historical Provider, MD   OneTouch Ultra2 Meter misc 1 applicator if needed. 4/29/24   Historical Provider, MD    OneTouch UltraSoft Lancets misc 1 Application if needed. 24   Historical Provider, MD     Allergies   Allergen Reactions   • Cat Hair Standardized Allergenic Extract Swelling   • Ciprofloxacin Itching   • Oxycodone Constipation   • Sulfa (Sulfonamide Antibiotics) Itching     Social History     Tobacco Use   • Smoking status: Former     Current packs/day: 0.00     Types: Cigarettes     Quit date:      Years since quittin.7   • Smokeless tobacco: Never   Substance Use Topics   • Alcohol use: Yes     Comment: OCCASIONAL         Chemistry    Lab Results   Component Value Date/Time     2024 0741    K 4.3 2024 0741     2024 0741    CO2 25 2024 0741    BUN 19 2024 0741    CREATININE 0.72 2024 0741    Lab Results   Component Value Date/Time    CALCIUM 8.7 2024 0741          Lab Results   Component Value Date/Time    WBC 7.2 2024 0741    HGB 13.6 2024 0741    HCT 42.4 2024 0741     2024 0741     Lab Results   Component Value Date/Time    PROTIME 12.0 2024 0741    INR 1.1 2024 0741     Encounter Date: 24   ECG 12 lead (Clinic Performed)    Narrative    Sinus Rhythm, HR 73 bpm      Relevant Problems   Cardiac   (+) Athscl heart disease of native coronary artery w/o ang pctrs   (+) Atypical chest pain   (+) Benign essential hypertension      /Renal   (+) Renal stone      Endocrine   (+) Morbid obesity (Multi)      Musculoskeletal   (+) Localized osteoarthrosis   (+) Primary localized osteoarthrosis of shoulder region       Clinical information reviewed:   Tobacco  Allergies  Meds   Med Hx  Surg Hx   Fam Hx  Soc Hx        NPO Detail:  NPO/Void Status  Carbohydrate Drink Given Prior to Surgery? : N  Date of Last Liquid: 10/08/24  Time of Last Liquid:   Date of Last Solid: 10/08/24  Time of Last Solid: 190  Last Intake Type: Clear fluids  Time of Last Void: 0500         Physical  Exam    Airway  Mallampati: III  TM distance: >3 FB     Cardiovascular - normal exam  Rhythm: regular  Rate: normal     Dental - normal exam     Pulmonary - normal exam     Abdominal - normal exam  Abdomen: soft           Anesthesia Plan    History of general anesthesia?: yes  History of complications of general anesthesia?: no    ASA 3     general and regional     The patient is not a current smoker.  Patient was previously instructed to abstain from smoking on day of procedure.  Patient did not smoke on day of procedure.  Education provided regarding risk of obstructive sleep apnea.  intravenous induction   Postoperative administration of opioids is intended.  Anesthetic plan and risks discussed with patient.  Use of blood products discussed with patient who.    Plan discussed with CAA.

## 2024-10-09 NOTE — ANESTHESIA PROCEDURE NOTES
Peripheral Block    Patient location during procedure: pre-op  Start time: 10/9/2024 7:42 AM  End time: 10/9/2024 7:46 AM  Reason for block: at surgeon's request and post-op pain management  Staffing  Performed: attending   Authorized by: Sylvester Silver DO    Performed by: Sylvester Silver DO  Preanesthetic Checklist  Completed: patient identified, IV checked, site marked, risks and benefits discussed, surgical consent, monitors and equipment checked, pre-op evaluation and timeout performed   Timeout performed at: 10/9/2024 7:40 AM  Peripheral Block  Patient position: laying flat  Prep: ChloraPrep  Patient monitoring: heart rate and continuous pulse ox  Block type: interscalene  Laterality: right  Injection technique: single-shot  Guidance: ultrasound guided  Local infiltration: ropivacaine  Needle  Needle type: Tuohy   Needle gauge: 22 G  Needle length: 5 cm  Needle localization: ultrasound guidance     image stored in chart  Assessment  Injection assessment: negative aspiration for heme, no paresthesia on injection, incremental injection and local visualized surrounding nerve on ultrasound  Additional Notes  Interscalene brachial plexus block: Informed consent obtained.  Risks and benefits discussed.  ASA monitors placed, timeout performed.  Patient position, prepped with chlorhexidine, and draped with sterile towels.      Ultrasound guidance used to visualize the brachial plexus and surrounding structures with visualization of the needle throughout duration of the procedure.  Aspiration was negative.  A total of Type of Local: 20 cc of 0.5% ropivacaine was divided and injected unilaterally. Patient tolerated procedure well.      Timeout by 740

## 2024-10-09 NOTE — CARE PLAN
The patient's goals for the shift include paincontrol     The clinical goals for the shift include pain control

## 2024-10-09 NOTE — ANESTHESIA PROCEDURE NOTES
Airway  Date/Time: 10/9/2024 8:03 AM  Urgency: elective    Airway not difficult    Staffing  Performed: CAYLA   Authorized by: Sylvester Silver DO    Performed by: CAYLA Esparza  Patient location during procedure: OR    Indications and Patient Condition  Indications for airway management: anesthesia and airway protection  Spontaneous ventilation: present  Sedation level: deep  Preoxygenated: yes  Patient position: sniffing  MILS maintained throughout  Mask difficulty assessment: 1 - vent by mask    Final Airway Details  Final airway type: endotracheal airway      Successful airway: ETT  Cuffed: yes   Successful intubation technique: video laryngoscopy  Facilitating devices/methods: intubating stylet  Endotracheal tube insertion site: oral  Blade: Nu  Blade size: #4  ETT size (mm): 7.5  Cormack-Lehane Classification: grade IIb - view of arytenoids or posterior of glottis only  Placement verified by: chest auscultation and capnometry   Inital cuff pressure (cm H2O): 12  Cuff volume (mL): 6  Measured from: lips  ETT to lips (cm): 23  Number of attempts at approach: 1  Number of other approaches attempted: 0

## 2024-10-09 NOTE — OP NOTE
Right anatomic total shoulder arthroplasty versus possible reverse shoulder arthroplasty. (R) Operative Note     Date: 10/9/2024  OR Location: [facility] OR    Name: Paco Goss : 1950, Age: 74 y.o., MRN: [id number], Sex: male    Diagnosis  Pre-op Diagnosis      * Arthritis of right shoulder region [M19.011] Post-op Diagnosis     * Arthritis of right shoulder region [M19.011]     Procedures  Right anatomic total shoulder arthroplasty.    Surgeons      * Jhony Nieto - Primary    Resident/Fellow/Other Assistant:  Surgeons and Role:  * No surgeons found with a matching role *    Procedure Summary  Anesthesia: Anesthesia type not filed in the log.  ASA: ASA status not filed in the log.  Anesthesia Staff: Anesthesiologist: Sylvester Silver DO  C-AA: CAYLA Esparza  Frontline Breaker: CAYLA Quinn  Estimated Blood Loss: 50mL  Intra-op Medications:   Administrations occurring from 0735 to 1015 on 10/09/24:   Medication Name Total Dose   ceFAZolin (Ancef) 3 g in dextrose 5%  mL 3 g              Anesthesia Record               Intraprocedure I/O Totals          Intake    Phenylephrine Drip 0.00 mL    The total shown is the total volume documented since Anesthesia Start was filed.    Total Intake 0 mL          Specimen: No specimens collected     Staff:   Circulator: Teri  Scrub Person: Hadley Museub Person: Heena Museub Person: Carmen Espinoza Circulator: Jeff         Drains and/or Catheters: * None in log *    Tourniquet Times:         Implants:  Implants       Type Name Action Serial No.      Implant CEMENT, BONE, SIMPLEX HV FULL DOSE  40 GM - [id number] Implanted      Joint GUIDE PIN, 3 X 75MM, STERILE - [id number] Implanted      Joint GUIDE PIN, 3 X 75MM, STERILE - [id number] Implanted       SMALL 40MM AEQUALIS PERFORM CORTILOC GLENOID PEGGED (FORMERLY TORNIER) Implanted [id number]      SIZE 4, PERFORM HUMERAL STEM, STANDARD, SHORT Implanted       18MM X 48MM PERFORM HUMERAL HEAD, COCR Implanted  [id number]      15MM X 41MM PERFORM HUMERAL HEAD, CENTERED Implanted               Findings: Financed osteoarthritis and fibrosis right glenohumeral joint.    Indications: Paco Goss is an 74 y.o. male who is having surgery for M19.011.     The patient was seen in the preoperative area. The risks, benefits, complications, treatment options, non-operative alternatives, expected recovery and outcomes were discussed with the patient. The possibilities of reaction to medication, pulmonary aspiration, injury to surrounding structures, bleeding, recurrent infection, the need for additional procedures, failure to diagnose a condition, and creating a complication requiring transfusion or operation were discussed with the patient. The patient concurred with the proposed plan, giving informed consent.  The site of surgery was properly noted/marked if necessary per policy. The patient has been actively warmed in preoperative area. Preoperative antibiotics have been ordered and given within 1 hours of incision. Venous thrombosis prophylaxis have been ordered including bilateral sequential compression devices and chemical prophylaxis    Procedure Details: Right anatomic total shoulder arthroplasty.    Components:  Tournier perform humeral stem size 4 Tornier perform  Anatomic glenoid size 40  Humeral head 48 x 18    Patient's BMI was 47 and added complexity to the surgical case.  This added more difficulty in terms of exposure as well as the arthrofibrosis and adhesions.  This added to the complexity of the surgical case.      Complications:  None; patient tolerated the procedure well.    Disposition: PACU - hemodynamically stable.  Condition: stable     73-year of age right-hand-dominant male has had chronic longstanding right shoulder pain.  He had been treated with anti-inflammatories.  Subsequently after his stent placement he was unable to use anti-inflammatories.  Corticosteroid injection was performed.  Therapy rehab was  performed.  Continue to have difficulty.  He had advanced arthritis based on radiographic studies.  MRI showed an intact rotator cuff.  Due to his failure of nonoperative treatment risks and benefits of nonoperative and operative treatment reviewed and he wanted proceed with operative approach.  Right anatomic versus reverse shoulder arthroplasty was reviewed.  The incision and the recovery was reviewed.  He was informed the risk to include but not limited to infection both perioperative and delayed, wound complications, implant loosening, instability, periprosthetic fracture, need for revision surgery, DVT, pulmonary embolus, neurovascular injury, complex regional pain syndrome, medical risk, anesthetic risk including catastrophic complications such as death.  Patient had been evaluated preoperatively by his medical physician and cardiology and and felt to be optimized for surgical management.  All questions were answered and consent was obtained.    Patient was brought to the operating where after induction of general and regional anesthesia was placed in a beachchair position a shoulder arthroscopy chair.  Examiner anesthesia demonstrated range of motion of the right shoulder of 150/50/60.  There is no evidence of instability on exam.  His right upper extremity was prepped and draped in usual sterile fashion.  He was given 3 g of cefazolin for prophylactic antibiotics.  Timeout was taken confirming surgical site, procedure, instrumentation, fire risk, and antibiotics.    15 blade was used to make an anterior incision for a standard deltopectoral approach to the shoulder.  Sharp dissection was carried down through skin and subcutaneous tissue.  Hemostasis maintained with electrocautery.  The deltopectoral interval was identified.  The cephalic vein was identified and protected taken laterally.  Deltopectoral fascia was incised.  The conjoined tendon was identified and retracted medially.  Due to the patient's  habitus this required extra time for the exposure and difficulty with the exposure.  Subscapularis and capsule were then incised and tagged.  Inferior and circumferential release was performed.  The inferior vessels were identified and ligated.  The shoulder was then subluxed.  Using the Mark media extramedullary guide mikayla was made on the humeral head and osteotomy of the humeral head was performed.  Inferior osteophytes were removed.  A protective cap was placed approximately in the humerus.  The deltoid was then exposed.  Labrum was excised.  Using the Maye Tornier system centralized pin was placed.  Reaming was performed.  Size 40 Tornier anatomic glenoid guide was utilized pin was performed.  Reaming was performed reaming more anteriorly and inferiorly.  Once was completed using the quarter lock glenoid guide drill holes were completed.  The trial size 40 glenoid was then placed.  This was well-fitting.  This was then removed.  Irrigation was performed.  Next cement was placed in the 3 peg holes.  The size 40 glenoid was impacted in position while the cement set up.  Attention was then drawn to the humeral side.  The proximal humerus was prepared.  Broaching was performed up to a size 4 with a standard short Tornier perform humeral stem.  This was then trialed felt to be stable with a 48 mm x 18 mm humeral head.  Trial components were then removed.  The Tornier perform size 4 humeral stem was impacted in position.  The 4818 humeral head was impacted.  This was then reduced again taken through range of motion felt to be stable throughout the range of motion.  Copious irrigation was then performed with Irrisept irrigation.  The capsule and subscapularis were then repaired with #2 Orthocord figure-of-eight sutures.  Deltopectoral interval was loosely closed.  Dermis approximated 2-0 Vicryl skin was approximated with 4 oh strata fix.  Silver impregnated sterile dressing was applied.  Patient was placed in a  shoulder immobilizer.  All sponge and instrument counts were correct at the end of the case.  Patient transferred the postanesthesia care in stable condition.        Attending Attestation:     Jhony Nieto  Phone Number: [phone]

## 2024-10-09 NOTE — ANESTHESIA POSTPROCEDURE EVALUATION
Patient: Paco Goss    Procedure Summary       Date: 10/09/24 Room / Location: [facility] OR 09 / Virtual [facility] OR    Anesthesia Start: 0752 Anesthesia Stop: 1102    Procedure: Right anatomic total shoulder arthroplasty versus possible reverse shoulder arthroplasty. (Right: Shoulder) Diagnosis:       Arthritis of right shoulder region      (M19.011)    Surgeons: Jhony Nieto MD Responsible Provider: Sylvester Silver DO    Anesthesia Type: general, regional ASA Status: 3            Anesthesia Type: general, regional    Vitals Value Taken Time   /76 10/09/24 1215   Temp 36.1 °C (97 °F) 10/09/24 1145   Pulse 66 10/09/24 1216   Resp 11 10/09/24 1216   SpO2 93 % 10/09/24 1216   Vitals shown include unfiled device data.    Anesthesia Post Evaluation    Patient location during evaluation: PACU  Patient participation: complete - patient participated  Level of consciousness: awake  Pain score: 0  Pain management: adequate  Multimodal analgesia pain management approach  Airway patency: patent  Two or more strategies used to mitigate risk of obstructive sleep apnea  Cardiovascular status: acceptable  Respiratory status: acceptable  Hydration status: acceptable  Postoperative Nausea and Vomiting: none        No notable events documented.

## 2024-10-10 VITALS
DIASTOLIC BLOOD PRESSURE: 61 MMHG | SYSTOLIC BLOOD PRESSURE: 136 MMHG | TEMPERATURE: 97.3 F | BODY MASS INDEX: 45.1 KG/M2 | OXYGEN SATURATION: 95 % | HEART RATE: 83 BPM | WEIGHT: 315 LBS | HEIGHT: 70 IN | RESPIRATION RATE: 16 BRPM

## 2024-10-10 PROBLEM — M19.011 ARTHRITIS OF RIGHT SHOULDER REGION: Status: ACTIVE | Noted: 2024-10-10

## 2024-10-10 LAB
ANION GAP SERPL CALC-SCNC: 12 MMOL/L (ref 10–20)
BUN SERPL-MCNC: 16 MG/DL (ref 6–23)
CALCIUM SERPL-MCNC: 8.3 MG/DL (ref 8.6–10.3)
CHLORIDE SERPL-SCNC: 104 MMOL/L (ref 98–107)
CO2 SERPL-SCNC: 24 MMOL/L (ref 21–32)
CREAT SERPL-MCNC: 0.79 MG/DL (ref 0.5–1.3)
EGFRCR SERPLBLD CKD-EPI 2021: >90 ML/MIN/1.73M*2
ERYTHROCYTE [DISTWIDTH] IN BLOOD BY AUTOMATED COUNT: 14.3 % (ref 11.5–14.5)
GLUCOSE SERPL-MCNC: 118 MG/DL (ref 74–99)
HCT VFR BLD AUTO: 38.6 % (ref 41–52)
HGB BLD-MCNC: 11.9 G/DL (ref 13.5–17.5)
MCH RBC QN AUTO: 28.7 PG (ref 26–34)
MCHC RBC AUTO-ENTMCNC: 30.8 G/DL (ref 32–36)
MCV RBC AUTO: 93 FL (ref 80–100)
NRBC BLD-RTO: 0 /100 WBCS (ref 0–0)
PLATELET # BLD AUTO: 170 X10*3/UL (ref 150–450)
POTASSIUM SERPL-SCNC: 4.3 MMOL/L (ref 3.5–5.3)
RBC # BLD AUTO: 4.14 X10*6/UL (ref 4.5–5.9)
SODIUM SERPL-SCNC: 136 MMOL/L (ref 136–145)
WBC # BLD AUTO: 9.7 X10*3/UL (ref 4.4–11.3)

## 2024-10-10 PROCEDURE — 2500000004 HC RX 250 GENERAL PHARMACY W/ HCPCS (ALT 636 FOR OP/ED): Performed by: ORTHOPAEDIC SURGERY

## 2024-10-10 PROCEDURE — 7100000011 HC EXTENDED STAY RECOVERY HOURLY - NURSING UNIT

## 2024-10-10 PROCEDURE — 2500000001 HC RX 250 WO HCPCS SELF ADMINISTERED DRUGS (ALT 637 FOR MEDICARE OP): Performed by: ORTHOPAEDIC SURGERY

## 2024-10-10 PROCEDURE — 85027 COMPLETE CBC AUTOMATED: CPT | Performed by: ORTHOPAEDIC SURGERY

## 2024-10-10 PROCEDURE — 36415 COLL VENOUS BLD VENIPUNCTURE: CPT | Performed by: ORTHOPAEDIC SURGERY

## 2024-10-10 PROCEDURE — 97535 SELF CARE MNGMENT TRAINING: CPT | Mod: GO

## 2024-10-10 PROCEDURE — 80048 BASIC METABOLIC PNL TOTAL CA: CPT | Performed by: ORTHOPAEDIC SURGERY

## 2024-10-10 PROCEDURE — 97165 OT EVAL LOW COMPLEX 30 MIN: CPT | Mod: GO

## 2024-10-10 PROCEDURE — 2500000001 HC RX 250 WO HCPCS SELF ADMINISTERED DRUGS (ALT 637 FOR MEDICARE OP): Performed by: PHYSICIAN ASSISTANT

## 2024-10-10 PROCEDURE — 99238 HOSP IP/OBS DSCHRG MGMT 30/<: CPT | Performed by: PHYSICIAN ASSISTANT

## 2024-10-10 PROCEDURE — 97161 PT EVAL LOW COMPLEX 20 MIN: CPT | Mod: GP | Performed by: PHYSICAL THERAPIST

## 2024-10-10 PROCEDURE — 97116 GAIT TRAINING THERAPY: CPT | Mod: GP | Performed by: PHYSICAL THERAPIST

## 2024-10-10 PROCEDURE — 2500000004 HC RX 250 GENERAL PHARMACY W/ HCPCS (ALT 636 FOR OP/ED): Performed by: PHYSICIAN ASSISTANT

## 2024-10-10 RX ORDER — OXYCODONE HYDROCHLORIDE 5 MG/1
5 TABLET ORAL EVERY 6 HOURS PRN
Qty: 28 TABLET | Refills: 0 | Status: SHIPPED | OUTPATIENT
Start: 2024-10-10 | End: 2024-10-17

## 2024-10-10 RX ORDER — DOCUSATE SODIUM 100 MG/1
100 CAPSULE, LIQUID FILLED ORAL 2 TIMES DAILY
Qty: 20 CAPSULE | Refills: 0 | Status: SHIPPED | OUTPATIENT
Start: 2024-10-10 | End: 2024-10-20

## 2024-10-10 RX ADMIN — ACETAMINOPHEN 325MG 650 MG: 325 TABLET ORAL at 05:53

## 2024-10-10 RX ADMIN — MORPHINE SULFATE 2 MG: 2 INJECTION, SOLUTION INTRAMUSCULAR; INTRAVENOUS at 07:03

## 2024-10-10 RX ADMIN — OXYCODONE HYDROCHLORIDE 10 MG: 10 TABLET ORAL at 09:55

## 2024-10-10 RX ADMIN — ASPIRIN 81 MG: 81 TABLET, COATED ORAL at 08:26

## 2024-10-10 RX ADMIN — OXYCODONE HYDROCHLORIDE 10 MG: 10 TABLET ORAL at 05:54

## 2024-10-10 RX ADMIN — ACETAMINOPHEN 325MG 650 MG: 325 TABLET ORAL at 00:32

## 2024-10-10 RX ADMIN — CALCIUM 1250 MG: 500 TABLET ORAL at 08:26

## 2024-10-10 RX ADMIN — OXYCODONE HYDROCHLORIDE AND ACETAMINOPHEN 500 MG: 500 TABLET ORAL at 08:26

## 2024-10-10 RX ADMIN — DEXTROSE MONOHYDRATE 3 G: 5 INJECTION INTRAVENOUS at 00:32

## 2024-10-10 RX ADMIN — DOCUSATE SODIUM 100 MG: 100 CAPSULE, LIQUID FILLED ORAL at 08:26

## 2024-10-10 RX ADMIN — ACETAMINOPHEN 325MG 650 MG: 325 TABLET ORAL at 11:22

## 2024-10-10 RX ADMIN — VITAM B12 100 MCG: 100 TAB at 08:26

## 2024-10-10 RX ADMIN — POLYETHYLENE GLYCOL 3350 17 G: 17 POWDER, FOR SOLUTION ORAL at 08:26

## 2024-10-10 ASSESSMENT — COGNITIVE AND FUNCTIONAL STATUS - GENERAL
EATING MEALS: A LITTLE
STANDING UP FROM CHAIR USING ARMS: A LITTLE
DRESSING REGULAR UPPER BODY CLOTHING: A LOT
PERSONAL GROOMING: A LITTLE
TOILETING: A LITTLE
MOVING TO AND FROM BED TO CHAIR: A LITTLE
CLIMB 3 TO 5 STEPS WITH RAILING: A LITTLE
TURNING FROM BACK TO SIDE WHILE IN FLAT BAD: A LITTLE
STANDING UP FROM CHAIR USING ARMS: A LITTLE
DAILY ACTIVITIY SCORE: 17
MOVING TO AND FROM BED TO CHAIR: A LITTLE
WALKING IN HOSPITAL ROOM: A LITTLE
DRESSING REGULAR LOWER BODY CLOTHING: A LITTLE
MOBILITY SCORE: 18
DAILY ACTIVITIY SCORE: 21
DRESSING REGULAR UPPER BODY CLOTHING: A LITTLE
MOBILITY SCORE: 19
HELP NEEDED FOR BATHING: A LITTLE
DRESSING REGULAR LOWER BODY CLOTHING: A LITTLE
TURNING FROM BACK TO SIDE WHILE IN FLAT BAD: A LITTLE
MOVING FROM LYING ON BACK TO SITTING ON SIDE OF FLAT BED WITH BEDRAILS: A LITTLE
WALKING IN HOSPITAL ROOM: A LITTLE
HELP NEEDED FOR BATHING: A LITTLE
CLIMB 3 TO 5 STEPS WITH RAILING: A LITTLE

## 2024-10-10 ASSESSMENT — PAIN - FUNCTIONAL ASSESSMENT
PAIN_FUNCTIONAL_ASSESSMENT: 0-10

## 2024-10-10 ASSESSMENT — PAIN DESCRIPTION - ORIENTATION
ORIENTATION: RIGHT

## 2024-10-10 ASSESSMENT — ACTIVITIES OF DAILY LIVING (ADL): HOME_MANAGEMENT_TIME_ENTRY: 15

## 2024-10-10 ASSESSMENT — PAIN SCALES - GENERAL
PAINLEVEL_OUTOF10: 7
PAINLEVEL_OUTOF10: 7
PAINLEVEL_OUTOF10: 3
PAINLEVEL_OUTOF10: 4
PAINLEVEL_OUTOF10: 8
PAINLEVEL_OUTOF10: 4
PAINLEVEL_OUTOF10: 7
PAINLEVEL_OUTOF10: 5 - MODERATE PAIN
PAINLEVEL_OUTOF10: 4

## 2024-10-10 ASSESSMENT — PAIN DESCRIPTION - LOCATION
LOCATION: SHOULDER

## 2024-10-10 NOTE — PROGRESS NOTES
"Paco Goss is a 74 y.o. male on day 0 of admission presenting with No Principal Problem: There is no principal problem currently on the Problem List. Please update the Problem List and refresh..    Subjective   Patient doing well.  He is having some pain in his the regional block is wearing off.  He was given pain medication.  He is tolerating oral diet.  Denies any chest pain, shortness of breath or lightheadedness.       Objective     Physical Exam  Comfortable no acute distress.  Alert and oriented x 3.  Breath sounds equal bilaterally.  Regular rate and rhythm.  Abdomen soft nontender nondistended.  Axillary patch intact.  Medial and lateral antebrachial cutaneous nerve distributions are intact.  Radial, ulnar, median nerve motor and sensory distribution are intact.  Radial pulse palpable and symmetric bilaterally.  Dressing right upper extremity clean, dry and intact.    Last Recorded Vitals  Blood pressure 121/65, pulse 76, temperature 36.9 °C (98.4 °F), temperature source Temporal, resp. rate 18, height 1.778 m (5' 10\"), weight (!) 151 kg (332 lb), SpO2 93%.  Intake/Output last 3 Shifts:  I/O last 3 completed shifts:  In: 1547.5 (10.3 mL/kg) [P.O.:750; I.V.:697.5 (4.6 mL/kg); IV Piggyback:100]  Out: 975 (6.5 mL/kg) [Urine:975 (0.2 mL/kg/hr)]  Weight: 150.6 kg     Relevant Results      Scheduled medications  acetaminophen, 650 mg, oral, q6h RICKI  ascorbic acid, 500 mg, oral, Daily  aspirin, 81 mg, oral, Daily  atorvastatin, 80 mg, oral, Nightly  calcium carbonate, 1,250 mg, oral, Daily  cyanocobalamin, 100 mcg, oral, Daily  docusate sodium, 100 mg, oral, BID  polyethylene glycol, 17 g, oral, Daily  tamsulosin, 0.4 mg, oral, Nightly      Continuous medications  lactated Ringer's, 50 mL/hr, Last Rate: Stopped (10/10/24 0558)      PRN medications  PRN medications: bisacodyl, cyclobenzaprine, morphine, ondansetron **OR** ondansetron, oxyCODONE, oxyCODONE, prochlorperazine **OR** prochlorperazine **OR** " prochlorperazine  Results for orders placed or performed during the hospital encounter of 10/09/24 (from the past 24 hour(s))   Basic metabolic panel   Result Value Ref Range    Glucose 118 (H) 74 - 99 mg/dL    Sodium 136 136 - 145 mmol/L    Potassium 4.3 3.5 - 5.3 mmol/L    Chloride 104 98 - 107 mmol/L    Bicarbonate 24 21 - 32 mmol/L    Anion Gap 12 10 - 20 mmol/L    Urea Nitrogen 16 6 - 23 mg/dL    Creatinine 0.79 0.50 - 1.30 mg/dL    eGFR >90 >60 mL/min/1.73m*2    Calcium 8.3 (L) 8.6 - 10.3 mg/dL   CBC POD 1   Result Value Ref Range    WBC 9.7 4.4 - 11.3 x10*3/uL    nRBC 0.0 0.0 - 0.0 /100 WBCs    RBC 4.14 (L) 4.50 - 5.90 x10*6/uL    Hemoglobin 11.9 (L) 13.5 - 17.5 g/dL    Hematocrit 38.6 (L) 41.0 - 52.0 %    MCV 93 80 - 100 fL    MCH 28.7 26.0 - 34.0 pg    MCHC 30.8 (L) 32.0 - 36.0 g/dL    RDW 14.3 11.5 - 14.5 %    Platelets 170 150 - 450 x10*3/uL                            Assessment/Plan   Assessment & Plan    Postoperative day #1 status post right anatomic total shoulder arthroplasty, doing well.    Complete antibiotics per protocol.  Probable discharge home later today.  Follow-up my office in 2 weeks.           Jhony Nieto MD

## 2024-10-10 NOTE — PROGRESS NOTES
Occupational Therapy    Evaluation    Patient Name: Paco Goss  MRN: [id number]  Department:   Room: [address] [place] [address]  Today's Date: 10/10/2024  Time Calculation  Start Time: 1035  Stop Time: 1105  Time Calculation (min): 30 min        Assessment:  Prognosis: Good  End of Session Communication: Bedside nurse  End of Session Patient Position: Up in chair, Alarm on  OT Assessment Results: Decreased ADL status, Decreased upper extremity range of motion, Decreased upper extremity strength, Decreased fine motor control, Decreased gross motor control, Decreased IADLs  Prognosis: Good  Strengths: Ability to acquire knowledge, Attitude of self, Coping skills, Housing layout, Insight into problems, Premorbid level of function, Physical health, Support of Caregivers  Plan:  Treatment Interventions: ADL retraining, Functional transfer training, UE strengthening/ROM, Patient/family training, Equipment evaluation/education, Endurance training  OT Frequency: Daily  OT Discharge Recommendations: Low intensity level of continued care  OT - OK to Discharge: Yes (from acute OT services to next level of care when medically cleared)  Treatment Interventions: ADL retraining, Functional transfer training, UE strengthening/ROM, Patient/family training, Equipment evaluation/education, Endurance training    Subjective   Current Problem:  1. Arthritis of right shoulder region  docusate sodium (Colace) 100 mg capsule    oxyCODONE (Roxicodone) 5 mg immediate release tablet        General:  General  Reason for Referral: patient s/p (R) THR  Referred By: Jhony Nieto MD  Past Medical History Relevant to Rehab: HTN, HLD, OA, Gout, hypoglycemia  Co-Treatment: PT  Co-Treatment Reason: to facilitate safety and activity tolerance  Prior to Session Communication: Bedside nurse  Patient Position Received: Bed, 2 rail up  Precautions:  Precautions Comment: Fall Precautions, (R) UE distal AROM, no proximal AROM, NWBing; sling and swath donned  throughout    Pain:  Pain Assessment  Pain Assessment: 0-10  0-10 (Numeric) Pain Score: 4 ((R) shoulder, distraction and cold pack applied)    Objective   Cognition:  Overall Cognitive Status: Within Functional Limits           Home Living:  Home Living Comments: patient reporting that he lives with his wife in a one story house with 2 SAKINA with (R) sided HR in place; patient does not use AD at PLOF; patient indepenent with ADLs, shares IADLs, declines recent falls and (+) driving; patient with WIS with shower chair and grab bars in place    ADL:  ADL Comments: Toileting: anticipate SBA, patient reporting use of bidet as needed for home going;  UB Dressing: MOD A overall; patient educated on yogesh dressing technique for overhead shirt and able to follow cues appropriately, patient's wife educated on technique and hand out provided; patient able to don/doff sling with MIN A and verbal cues; LB Dressing: MIN A for managing pants up over (R) hip in standing with SBA, able to thread (B) LEs while seated EOB; Grooming: set up assist, in standing; Feeding: set up assist, seated    Bed Mobility/Transfers: Bed Mobility  Bed Mobility: Yes  Bed Mobility 1  Bed Mobility Comments 1: Supine > Sit: SBA    Transfers  Transfer: Yes (completing functional transfers and household distance mobility without AD at SBA level, trial use of straight cane for support as needed however overall doing well and without LOB throughout)      Sensation:  Sensation Comment: declines numbness/tingling  Strength:  Strength Comments: (L) UE grossly 4/5 observed through functinal activity, (R) UE not assessed 2/2  precautions    Extremities: RUE   RUE :  (distal AROM WFL, proximal AROM not assessed 2/2 precautions) and LUE   LUE: Within Functional Limits      Outcome Measures:[facility] Daily Activity  Putting on and taking off regular lower body clothing: A little  Bathing (including washing, rinsing, drying): A little  Putting on and taking off regular  "upper body clothing: A lot  Toileting, which includes using toilet, bedpan or urinal: A little  Taking care of personal grooming such as brushing teeth: A little  Eating Meals: A little  Daily Activity - Total Score: 17        Education Documentation  Handouts, taught by Modesta Walker OT at 10/10/2024  3:47 PM.  Learner: Family, Patient  Readiness: Acceptance  Method: Explanation  Response: Verbalizes Understanding, Needs Reinforcement    Body Mechanics, taught by Modesta Walker OT at 10/10/2024  3:47 PM.  Learner: Family, Patient  Readiness: Acceptance  Method: Explanation  Response: Verbalizes Understanding, Needs Reinforcement    Precautions, taught by Modesta Walker OT at 10/10/2024  3:47 PM.  Learner: Family, Patient  Readiness: Acceptance  Method: Explanation  Response: Verbalizes Understanding, Needs Reinforcement    ADL Training, taught by Modesta Walker OT at 10/10/2024  3:47 PM.  Learner: Family, Patient  Readiness: Acceptance  Method: Explanation  Response: Verbalizes Understanding, Needs Reinforcement    Education Comments  No comments found.        OP EDUCATION:  Education  Individual(s) Educated: Patient  Education Provided: Fall precautons, Ergonomics and postural realignment  Education Comment: will require continued education    Goals:  Encounter Problems       Encounter Problems (Active)       OT Goals       Patient will complete LB dressing at MOD I level to facilitate safety and independence for home going \" (Progressing)       Start:  10/10/24    Expected End:  10/24/24            Patient will complete UB dressing at MOD I level to facilitate safety and independence for home going   (Progressing)       Start:  10/10/24    Expected End:  10/24/24            patient will verbalize good understanding of (R) TSA precautions and demonstrating good carry over through completion of ADL with no more than 3 verbal cues  (Progressing)       Start:  10/10/24    Expected End:  10/24/24       "      Patient will complete functional transfers at MOD I level with LRD to facilitate increased independence and safety with home going  (Progressing)       Start:  10/10/24    Expected End:  10/24/24            Patient will complete toileting at MOD I level to facilitate safety and independence for home going   (Progressing)       Start:  10/10/24    Expected End:  10/24/24

## 2024-10-10 NOTE — DISCHARGE SUMMARY
Discharge Diagnosis  Arthritis of right shoulder region      Discharge summary      This patient Paco Goss was admitted to the hospital on 10/9/2024  after undergoing Procedure(s) (LRB):  Right anatomic total shoulder arthroplasty versus possible reverse shoulder arthroplasty. (Right) without complications.      No significant or unexpected findings or abnormal ortho imaging were noted during the hospital stay    Hospital course      Patient tolerated surgical procedure well and there was no complications. Patient progressed adequately through their recovery during hospital stay including PT and rehabilitation.    Patient was then D/C on 10/10/2024 to home  in stable condition.  Patient was instructed on the use of pain medications, the signs and symptoms of infection, and was given our number to call should they have any questions or concerns following discharge.    Based on my clinical judgment, the patient was provided with a 7-day prescription for opioid medication at 30 MED, indicated for treatment of acute pain in the setting of recent  right TSA. OARRS report was run and has demonstrated an appropriate time course.  The patient has been provided with counseling pertaining to safe use of opioid medication.    Pertinent Physical Exam At Time of Discharge  Alert, oriented x 3, cooperative with examination.     Examination of the right upper  extremity reveals right shoulder  dressing is intact without drainage.  No aram-incisional ecchymosis. Light touch sensation right UE and motor function right wrist, hand and fingers intact, right radial pulse2+ with capillary refill less than 2 seconds.        Home Medications     Medication List      START taking these medications     docusate sodium 100 mg capsule; Commonly known as: Colace; Take 1   capsule (100 mg) by mouth 2 times a day for 10 days. Stop taking this   medication if you have diarrhea   oxyCODONE 5 mg immediate release tablet; Commonly known as:  Roxicodone;   Take 1 tablet (5 mg) by mouth every 6 hours if needed for moderate pain (4   - 6) or severe pain (7 - 10) for up to 7 days.     CONTINUE taking these medications     ascorbic acid 500 mg tablet; Commonly known as: Vitamin C   aspirin 81 mg EC tablet   atorvastatin 80 mg tablet; Commonly known as: Lipitor; Take 1 tablet (80   mg) by mouth once daily at bedtime.   calcium carbonate 500 mg calcium (1,250 mg) tablet; Commonly known as:   Oscal   cholecalciferol 25 MCG (1000 UT) capsule; Commonly known as: Vitamin D-3   cyanocobalamin 100 mcg tablet; Commonly known as: Vitamin B-12   Flomax 0.4 mg 24 hr capsule; Generic drug: tamsulosin   multivitamin with minerals tablet   OneTouch Ultra Test strip; Generic drug: blood sugar diagnostic   OneTouch Ultra2 Meter misc; Generic drug: blood-glucose meter   OneTouch UltraSoft Lancets misc; Generic drug: lancets     STOP taking these medications     chlorhexidine 0.12 % solution; Commonly known as: Peridex           Patient may not bear any weight  to operative extremity with use of walker for assistance with ambulation   Surgical dressing to be removed pod7 and incision left open to air  Encourage frequent ambulation DVT prophylaxis   Follow up with surgeon in 2 weeks    Radiology images CT shoulder right wo IV contrast    Result Date: 9/18/2024  Interpreted By:  Silvio Amezcua, STUDY: CT SHOULDER RIGHT WO IV CONTRAST;  9/18/2024 10:18 am   INDICATION: Signs/Symptoms:M19.011.   COMPARISON: None.   ACCESSION NUMBER(S): [id number]   ORDERING CLINICIAN: RITESH NUÑEZ   TECHNIQUE: Preoperative CT imaging of the right shoulder was obtained without administration of intravenous contrast medium.   FINDINGS: OSSEOUS STRUCTURES: No acute fracture or dislocation. Severe glenohumeral osteoarthritis. There is no significant decentering of the humeral head relative to the glenoid. Mild central glenoid wear with small subchondral cysts inferiorly without significant bone  stock loss. Severe acromioclavicular osteoarthritis. Minimal glenohumeral joint effusion.   SOFT TISSUES: No subcutaneous fluid collection. The deltoid and rotator cuff muscle bulk is grossly maintained. No suspicious axillary lymphadenopathy. Imaged right lung is clear.       Preoperative CT. No acute osseous or soft tissue abnormalities. Severe glenohumeral osteoarthritis with minimal joint effusion. Mild central glenoid wear with small subchondral cysts inferiorly without significant bone stock loss.   MACRO None   Signed by: Silvio Amezcua 9/18/2024 2:07 PM Dictation workstation:   [id number]    CT shoulder right wo IV contrast    Result Date: 9/18/2024   Interpreted By:  Silvio Amezcua, STUDY: CT SHOULDER RIGHT WO IV CONTRAST;  9/18/2024 10:18 am    INDICATION: Signs/Symptoms:M19.011.    COMPARISON: None.    ACCESSION NUMBER(S): [id number]    ORDERING CLINICIAN: RITESH NUÑEZ    TECHNIQUE: Preoperative CT imaging of the right shoulder was obtained without administration of intravenous contrast medium.    FINDINGS: OSSEOUS STRUCTURES: No acute fracture or dislocation. Severe glenohumeral osteoarthritis. There is no significant decentering of the humeral head relative to the glenoid. Mild central glenoid wear with small subchondral cysts inferiorly without significant bone stock loss. Severe acromioclavicular osteoarthritis. Minimal glenohumeral joint effusion.    SOFT TISSUES: No subcutaneous fluid collection. The deltoid and rotator cuff muscle bulk is grossly maintained. No suspicious axillary lymphadenopathy. Imaged right lung is clear.    IMPRESSION: Preoperative CT. No acute osseous or soft tissue abnormalities. Severe glenohumeral osteoarthritis with minimal joint effusion. Mild central glenoid wear with small subchondral cysts inferiorly without significant bone stock loss.    MACRO None    Signed by: Silvio Amezcua 9/18/2024 2:07 PM Dictation workstation:   [id number]       Outpatient  Follow-Up  Future Appointments   Date Time Provider Department Center   1/21/2025  2:45 PM Brant Michaud MD [id number] [place]             Alexa Baig PA-C

## 2024-10-10 NOTE — PROGRESS NOTES
10/10/24 0856   Discharge Planning   Living Arrangements Spouse/significant other   Support Systems Spouse/significant other   Type of Residence Private residence   Home or Post Acute Services None   Expected Discharge Disposition Home   Does the patient need discharge transport arranged? No     Met with patient and spouse at bedside. Admitted for R shoulder surgery. Pt lives with spouse and was independent PTA with no HHC or DME. Shoulder sling in place. PCP is Brant Smith. Pt feels he is able to manage his health and understands his medications. Was able to drive and obtain meds. Therapy evals pending. Pt plans to return home with no new discharge needs. Family will provide transport home.

## 2024-10-10 NOTE — NURSING NOTE
Discharge instructions given and reviewed with patient and patients wife. Patient verbalizes understanding. IV removed. Patient belongings gathered up. Patient being discharged home via wifes private car.

## 2024-10-10 NOTE — CARE PLAN
The patient's goals for the shift include      The clinical goals for the shift include pain management    Over the shift, the patient did make progress toward the following goal of pain management.

## 2024-10-23 ENCOUNTER — APPOINTMENT (OUTPATIENT)
Dept: RADIOLOGY | Facility: HOSPITAL | Age: 74
End: 2024-10-23
Payer: MEDICARE

## 2024-10-23 ENCOUNTER — ANESTHESIA (OUTPATIENT)
Dept: OPERATING ROOM | Facility: HOSPITAL | Age: 74
End: 2024-10-23
Payer: MEDICARE

## 2024-10-23 ENCOUNTER — HOSPITAL ENCOUNTER (OUTPATIENT)
Facility: HOSPITAL | Age: 74
Discharge: HOME | End: 2024-10-24
Attending: ORTHOPAEDIC SURGERY | Admitting: ORTHOPAEDIC SURGERY
Payer: MEDICARE

## 2024-10-23 ENCOUNTER — ANESTHESIA EVENT (OUTPATIENT)
Dept: OPERATING ROOM | Facility: HOSPITAL | Age: 74
End: 2024-10-23
Payer: MEDICARE

## 2024-10-23 DIAGNOSIS — T81.9XXD: Primary | ICD-10-CM

## 2024-10-23 DIAGNOSIS — M19.011 ARTHRITIS OF RIGHT SHOULDER REGION: ICD-10-CM

## 2024-10-23 DIAGNOSIS — Z96.611 PRESENCE OF RIGHT ARTIFICIAL SHOULDER JOINT: ICD-10-CM

## 2024-10-23 PROBLEM — T81.9XXA POSTOPERATIVE OR SURGICAL COMPLICATION: Status: ACTIVE | Noted: 2024-10-23

## 2024-10-23 LAB
ALBUMIN SERPL BCP-MCNC: 3.5 G/DL (ref 3.4–5)
ALP SERPL-CCNC: 41 U/L (ref 33–136)
ALT SERPL W P-5'-P-CCNC: 28 U/L (ref 10–52)
ANION GAP SERPL CALC-SCNC: 10 MMOL/L (ref 10–20)
AST SERPL W P-5'-P-CCNC: 23 U/L (ref 9–39)
BASOPHILS # BLD AUTO: 0.09 X10*3/UL (ref 0–0.1)
BASOPHILS NFR BLD AUTO: 1 %
BILIRUB SERPL-MCNC: 0.6 MG/DL (ref 0–1.2)
BUN SERPL-MCNC: 15 MG/DL (ref 6–23)
CALCIUM SERPL-MCNC: 8.8 MG/DL (ref 8.6–10.3)
CHLORIDE SERPL-SCNC: 106 MMOL/L (ref 98–107)
CO2 SERPL-SCNC: 27 MMOL/L (ref 21–32)
CREAT SERPL-MCNC: 0.73 MG/DL (ref 0.5–1.3)
EGFRCR SERPLBLD CKD-EPI 2021: >90 ML/MIN/1.73M*2
EOSINOPHIL # BLD AUTO: 0.5 X10*3/UL (ref 0–0.4)
EOSINOPHIL NFR BLD AUTO: 5.5 %
ERYTHROCYTE [DISTWIDTH] IN BLOOD BY AUTOMATED COUNT: 14.7 % (ref 11.5–14.5)
GLUCOSE SERPL-MCNC: 94 MG/DL (ref 74–99)
HCT VFR BLD AUTO: 37.6 % (ref 41–52)
HGB BLD-MCNC: 11.8 G/DL (ref 13.5–17.5)
HOLD SPECIMEN: NORMAL
IMM GRANULOCYTES # BLD AUTO: 0.04 X10*3/UL (ref 0–0.5)
IMM GRANULOCYTES NFR BLD AUTO: 0.4 % (ref 0–0.9)
LYMPHOCYTES # BLD AUTO: 1.41 X10*3/UL (ref 0.8–3)
LYMPHOCYTES NFR BLD AUTO: 15.5 %
MCH RBC QN AUTO: 28.3 PG (ref 26–34)
MCHC RBC AUTO-ENTMCNC: 31.4 G/DL (ref 32–36)
MCV RBC AUTO: 90 FL (ref 80–100)
MONOCYTES # BLD AUTO: 0.49 X10*3/UL (ref 0.05–0.8)
MONOCYTES NFR BLD AUTO: 5.4 %
NEUTROPHILS # BLD AUTO: 6.57 X10*3/UL (ref 1.6–5.5)
NEUTROPHILS NFR BLD AUTO: 72.2 %
NRBC BLD-RTO: 0 /100 WBCS (ref 0–0)
PLATELET # BLD AUTO: 268 X10*3/UL (ref 150–450)
POTASSIUM SERPL-SCNC: 4.3 MMOL/L (ref 3.5–5.3)
PROT SERPL-MCNC: 6.4 G/DL (ref 6.4–8.2)
RBC # BLD AUTO: 4.17 X10*6/UL (ref 4.5–5.9)
SODIUM SERPL-SCNC: 139 MMOL/L (ref 136–145)
WBC # BLD AUTO: 9.1 X10*3/UL (ref 4.4–11.3)

## 2024-10-23 PROCEDURE — 80053 COMPREHEN METABOLIC PANEL: CPT | Performed by: PHYSICIAN ASSISTANT

## 2024-10-23 PROCEDURE — 2780000003 HC OR 278 NO HCPCS: Performed by: ORTHOPAEDIC SURGERY

## 2024-10-23 PROCEDURE — 7100000001 HC RECOVERY ROOM TIME - INITIAL BASE CHARGE: Performed by: ORTHOPAEDIC SURGERY

## 2024-10-23 PROCEDURE — 36415 COLL VENOUS BLD VENIPUNCTURE: CPT | Performed by: PHYSICIAN ASSISTANT

## 2024-10-23 PROCEDURE — 2500000001 HC RX 250 WO HCPCS SELF ADMINISTERED DRUGS (ALT 637 FOR MEDICARE OP): Performed by: PHYSICIAN ASSISTANT

## 2024-10-23 PROCEDURE — A9999 DME SUPPLY OR ACCESSORY, NOS: HCPCS | Mod: MUE | Performed by: ORTHOPAEDIC SURGERY

## 2024-10-23 PROCEDURE — 2720000007 HC OR 272 NO HCPCS: Performed by: ORTHOPAEDIC SURGERY

## 2024-10-23 PROCEDURE — 2500000004 HC RX 250 GENERAL PHARMACY W/ HCPCS (ALT 636 FOR OP/ED): Performed by: ORTHOPAEDIC SURGERY

## 2024-10-23 PROCEDURE — 99221 1ST HOSP IP/OBS SF/LOW 40: CPT | Performed by: PHYSICIAN ASSISTANT

## 2024-10-23 PROCEDURE — C1776 JOINT DEVICE (IMPLANTABLE): HCPCS | Performed by: ORTHOPAEDIC SURGERY

## 2024-10-23 PROCEDURE — 2500000002 HC RX 250 W HCPCS SELF ADMINISTERED DRUGS (ALT 637 FOR MEDICARE OP, ALT 636 FOR OP/ED): Performed by: ORTHOPAEDIC SURGERY

## 2024-10-23 PROCEDURE — 3700000001 HC GENERAL ANESTHESIA TIME - INITIAL BASE CHARGE: Performed by: ORTHOPAEDIC SURGERY

## 2024-10-23 PROCEDURE — 2500000005 HC RX 250 GENERAL PHARMACY W/O HCPCS: Performed by: ANESTHESIOLOGY

## 2024-10-23 PROCEDURE — 2500000001 HC RX 250 WO HCPCS SELF ADMINISTERED DRUGS (ALT 637 FOR MEDICARE OP): Performed by: ORTHOPAEDIC SURGERY

## 2024-10-23 PROCEDURE — 7100000011 HC EXTENDED STAY RECOVERY HOURLY - NURSING UNIT

## 2024-10-23 PROCEDURE — 2500000004 HC RX 250 GENERAL PHARMACY W/ HCPCS (ALT 636 FOR OP/ED): Performed by: ANESTHESIOLOGIST ASSISTANT

## 2024-10-23 PROCEDURE — 87186 SC STD MICRODIL/AGAR DIL: CPT | Mod: STJLAB | Performed by: ORTHOPAEDIC SURGERY

## 2024-10-23 PROCEDURE — 7100000002 HC RECOVERY ROOM TIME - EACH INCREMENTAL 1 MINUTE: Performed by: ORTHOPAEDIC SURGERY

## 2024-10-23 PROCEDURE — 3600000005 HC OR TIME - INITIAL BASE CHARGE - PROCEDURE LEVEL FIVE: Performed by: ORTHOPAEDIC SURGERY

## 2024-10-23 PROCEDURE — 2500000004 HC RX 250 GENERAL PHARMACY W/ HCPCS (ALT 636 FOR OP/ED): Performed by: ANESTHESIOLOGY

## 2024-10-23 PROCEDURE — 3700000002 HC GENERAL ANESTHESIA TIME - EACH INCREMENTAL 1 MINUTE: Performed by: ORTHOPAEDIC SURGERY

## 2024-10-23 PROCEDURE — 3600000010 HC OR TIME - EACH INCREMENTAL 1 MINUTE - PROCEDURE LEVEL FIVE: Performed by: ORTHOPAEDIC SURGERY

## 2024-10-23 PROCEDURE — 2500000005 HC RX 250 GENERAL PHARMACY W/O HCPCS: Performed by: ANESTHESIOLOGIST ASSISTANT

## 2024-10-23 PROCEDURE — 87075 CULTR BACTERIA EXCEPT BLOOD: CPT | Mod: STJLAB | Performed by: ORTHOPAEDIC SURGERY

## 2024-10-23 PROCEDURE — 85025 COMPLETE CBC W/AUTO DIFF WBC: CPT | Performed by: PHYSICIAN ASSISTANT

## 2024-10-23 DEVICE — IMPLANTABLE DEVICE: Type: IMPLANTABLE DEVICE | Site: SHOULDER | Status: FUNCTIONAL

## 2024-10-23 RX ORDER — BISACODYL 5 MG
10 TABLET, DELAYED RELEASE (ENTERIC COATED) ORAL DAILY PRN
Status: DISCONTINUED | OUTPATIENT
Start: 2024-10-23 | End: 2024-10-24 | Stop reason: HOSPADM

## 2024-10-23 RX ORDER — ROCURONIUM BROMIDE 50 MG/5 ML
SYRINGE (ML) INTRAVENOUS AS NEEDED
Status: DISCONTINUED | OUTPATIENT
Start: 2024-10-23 | End: 2024-10-23

## 2024-10-23 RX ORDER — DIPHENHYDRAMINE HYDROCHLORIDE 50 MG/ML
12.5 INJECTION INTRAMUSCULAR; INTRAVENOUS ONCE AS NEEDED
Status: DISCONTINUED | OUTPATIENT
Start: 2024-10-23 | End: 2024-10-23 | Stop reason: HOSPADM

## 2024-10-23 RX ORDER — ONDANSETRON HYDROCHLORIDE 2 MG/ML
INJECTION, SOLUTION INTRAVENOUS AS NEEDED
Status: DISCONTINUED | OUTPATIENT
Start: 2024-10-23 | End: 2024-10-23

## 2024-10-23 RX ORDER — FENTANYL CITRATE 50 UG/ML
INJECTION, SOLUTION INTRAMUSCULAR; INTRAVENOUS AS NEEDED
Status: DISCONTINUED | OUTPATIENT
Start: 2024-10-23 | End: 2024-10-23

## 2024-10-23 RX ORDER — MIDAZOLAM HYDROCHLORIDE 1 MG/ML
1 INJECTION, SOLUTION INTRAMUSCULAR; INTRAVENOUS ONCE AS NEEDED
Status: DISCONTINUED | OUTPATIENT
Start: 2024-10-23 | End: 2024-10-23 | Stop reason: HOSPADM

## 2024-10-23 RX ORDER — TAMSULOSIN HYDROCHLORIDE 0.4 MG/1
0.4 CAPSULE ORAL NIGHTLY
Status: DISCONTINUED | OUTPATIENT
Start: 2024-10-23 | End: 2024-10-24 | Stop reason: HOSPADM

## 2024-10-23 RX ORDER — GLYCOPYRROLATE 0.2 MG/ML
INJECTION INTRAMUSCULAR; INTRAVENOUS AS NEEDED
Status: DISCONTINUED | OUTPATIENT
Start: 2024-10-23 | End: 2024-10-23

## 2024-10-23 RX ORDER — HYDROMORPHONE HYDROCHLORIDE 1 MG/ML
INJECTION, SOLUTION INTRAMUSCULAR; INTRAVENOUS; SUBCUTANEOUS AS NEEDED
Status: DISCONTINUED | OUTPATIENT
Start: 2024-10-23 | End: 2024-10-23

## 2024-10-23 RX ORDER — ASPIRIN 81 MG/1
81 TABLET ORAL DAILY
Status: DISCONTINUED | OUTPATIENT
Start: 2024-10-23 | End: 2024-10-23

## 2024-10-23 RX ORDER — OXYCODONE HYDROCHLORIDE 5 MG/1
5 TABLET ORAL EVERY 6 HOURS PRN
Status: DISCONTINUED | OUTPATIENT
Start: 2024-10-23 | End: 2024-10-23

## 2024-10-23 RX ORDER — DOCUSATE SODIUM 100 MG/1
100 CAPSULE, LIQUID FILLED ORAL 2 TIMES DAILY
Status: DISCONTINUED | OUTPATIENT
Start: 2024-10-23 | End: 2024-10-24 | Stop reason: HOSPADM

## 2024-10-23 RX ORDER — ASPIRIN 81 MG/1
81 TABLET ORAL 2 TIMES DAILY
Status: DISCONTINUED | OUTPATIENT
Start: 2024-10-23 | End: 2024-10-24 | Stop reason: HOSPADM

## 2024-10-23 RX ORDER — PROCHLORPERAZINE MALEATE 10 MG
10 TABLET ORAL EVERY 6 HOURS PRN
Status: DISCONTINUED | OUTPATIENT
Start: 2024-10-23 | End: 2024-10-24 | Stop reason: HOSPADM

## 2024-10-23 RX ORDER — HYDROCODONE BITARTRATE AND ACETAMINOPHEN 5; 325 MG/1; MG/1
1 TABLET ORAL EVERY 4 HOURS PRN
Status: DISCONTINUED | OUTPATIENT
Start: 2024-10-23 | End: 2024-10-23 | Stop reason: HOSPADM

## 2024-10-23 RX ORDER — PROCHLORPERAZINE 25 MG/1
25 SUPPOSITORY RECTAL EVERY 12 HOURS PRN
Status: DISCONTINUED | OUTPATIENT
Start: 2024-10-23 | End: 2024-10-24 | Stop reason: HOSPADM

## 2024-10-23 RX ORDER — MIDAZOLAM HYDROCHLORIDE 1 MG/ML
INJECTION, SOLUTION INTRAMUSCULAR; INTRAVENOUS AS NEEDED
Status: DISCONTINUED | OUTPATIENT
Start: 2024-10-23 | End: 2024-10-23

## 2024-10-23 RX ORDER — CYCLOBENZAPRINE HCL 10 MG
10 TABLET ORAL 3 TIMES DAILY PRN
Status: DISCONTINUED | OUTPATIENT
Start: 2024-10-23 | End: 2024-10-24 | Stop reason: HOSPADM

## 2024-10-23 RX ORDER — NYSTATIN 100000 [USP'U]/G
1 POWDER TOPICAL 2 TIMES DAILY
Status: DISCONTINUED | OUTPATIENT
Start: 2024-10-23 | End: 2024-10-24 | Stop reason: HOSPADM

## 2024-10-23 RX ORDER — ALBUTEROL SULFATE 0.83 MG/ML
2.5 SOLUTION RESPIRATORY (INHALATION)
Status: DISCONTINUED | OUTPATIENT
Start: 2024-10-23 | End: 2024-10-23 | Stop reason: HOSPADM

## 2024-10-23 RX ORDER — DOCUSATE SODIUM 100 MG/1
100 CAPSULE, LIQUID FILLED ORAL 2 TIMES DAILY
Status: DISCONTINUED | OUTPATIENT
Start: 2024-10-23 | End: 2024-10-23

## 2024-10-23 RX ORDER — SODIUM CHLORIDE, SODIUM LACTATE, POTASSIUM CHLORIDE, CALCIUM CHLORIDE 600; 310; 30; 20 MG/100ML; MG/100ML; MG/100ML; MG/100ML
100 INJECTION, SOLUTION INTRAVENOUS CONTINUOUS
Status: DISCONTINUED | OUTPATIENT
Start: 2024-10-23 | End: 2024-10-23 | Stop reason: HOSPADM

## 2024-10-23 RX ORDER — OXYCODONE HYDROCHLORIDE 10 MG/1
10 TABLET ORAL EVERY 4 HOURS PRN
Status: DISCONTINUED | OUTPATIENT
Start: 2024-10-23 | End: 2024-10-24 | Stop reason: HOSPADM

## 2024-10-23 RX ORDER — CALCIUM CARBONATE 500(1250)
1250 TABLET ORAL DAILY
Status: DISCONTINUED | OUTPATIENT
Start: 2024-10-23 | End: 2024-10-24 | Stop reason: HOSPADM

## 2024-10-23 RX ORDER — SODIUM CHLORIDE, SODIUM LACTATE, POTASSIUM CHLORIDE, CALCIUM CHLORIDE 600; 310; 30; 20 MG/100ML; MG/100ML; MG/100ML; MG/100ML
50 INJECTION, SOLUTION INTRAVENOUS CONTINUOUS
Status: DISCONTINUED | OUTPATIENT
Start: 2024-10-23 | End: 2024-10-24 | Stop reason: HOSPADM

## 2024-10-23 RX ORDER — HYDRALAZINE HYDROCHLORIDE 20 MG/ML
5 INJECTION INTRAMUSCULAR; INTRAVENOUS EVERY 30 MIN PRN
Status: DISCONTINUED | OUTPATIENT
Start: 2024-10-23 | End: 2024-10-23 | Stop reason: HOSPADM

## 2024-10-23 RX ORDER — HYDROMORPHONE HYDROCHLORIDE 1 MG/ML
1 INJECTION, SOLUTION INTRAMUSCULAR; INTRAVENOUS; SUBCUTANEOUS EVERY 5 MIN PRN
Status: DISCONTINUED | OUTPATIENT
Start: 2024-10-23 | End: 2024-10-23 | Stop reason: HOSPADM

## 2024-10-23 RX ORDER — ATORVASTATIN CALCIUM 80 MG/1
80 TABLET, FILM COATED ORAL NIGHTLY
Status: DISCONTINUED | OUTPATIENT
Start: 2024-10-23 | End: 2024-10-24 | Stop reason: HOSPADM

## 2024-10-23 RX ORDER — ONDANSETRON 4 MG/1
4 TABLET, ORALLY DISINTEGRATING ORAL EVERY 8 HOURS PRN
Status: DISCONTINUED | OUTPATIENT
Start: 2024-10-23 | End: 2024-10-24 | Stop reason: HOSPADM

## 2024-10-23 RX ORDER — PROPOFOL 10 MG/ML
INJECTION, EMULSION INTRAVENOUS AS NEEDED
Status: DISCONTINUED | OUTPATIENT
Start: 2024-10-23 | End: 2024-10-23

## 2024-10-23 RX ORDER — OXYCODONE HYDROCHLORIDE 5 MG/1
5 TABLET ORAL EVERY 4 HOURS PRN
Status: DISCONTINUED | OUTPATIENT
Start: 2024-10-23 | End: 2024-10-24 | Stop reason: HOSPADM

## 2024-10-23 RX ORDER — ONDANSETRON HYDROCHLORIDE 2 MG/ML
4 INJECTION, SOLUTION INTRAVENOUS EVERY 8 HOURS PRN
Status: DISCONTINUED | OUTPATIENT
Start: 2024-10-23 | End: 2024-10-24 | Stop reason: HOSPADM

## 2024-10-23 RX ORDER — LABETALOL HYDROCHLORIDE 5 MG/ML
5 INJECTION, SOLUTION INTRAVENOUS
Status: DISCONTINUED | OUTPATIENT
Start: 2024-10-23 | End: 2024-10-23 | Stop reason: HOSPADM

## 2024-10-23 RX ORDER — MORPHINE SULFATE 2 MG/ML
2 INJECTION, SOLUTION INTRAMUSCULAR; INTRAVENOUS EVERY 4 HOURS PRN
Status: DISCONTINUED | OUTPATIENT
Start: 2024-10-23 | End: 2024-10-24 | Stop reason: HOSPADM

## 2024-10-23 RX ORDER — ACETAMINOPHEN 325 MG/1
650 TABLET ORAL EVERY 6 HOURS SCHEDULED
Status: DISCONTINUED | OUTPATIENT
Start: 2024-10-23 | End: 2024-10-24 | Stop reason: HOSPADM

## 2024-10-23 RX ORDER — PHENYLEPHRINE HCL IN 0.9% NACL 1 MG/10 ML
SYRINGE (ML) INTRAVENOUS AS NEEDED
Status: DISCONTINUED | OUTPATIENT
Start: 2024-10-23 | End: 2024-10-23

## 2024-10-23 RX ORDER — METOCLOPRAMIDE HYDROCHLORIDE 5 MG/ML
10 INJECTION INTRAMUSCULAR; INTRAVENOUS ONCE AS NEEDED
Status: DISCONTINUED | OUTPATIENT
Start: 2024-10-23 | End: 2024-10-23 | Stop reason: HOSPADM

## 2024-10-23 RX ORDER — POLYETHYLENE GLYCOL 3350 17 G/17G
17 POWDER, FOR SOLUTION ORAL 2 TIMES DAILY
Status: DISCONTINUED | OUTPATIENT
Start: 2024-10-24 | End: 2024-10-24 | Stop reason: HOSPADM

## 2024-10-23 RX ORDER — LIDOCAINE HYDROCHLORIDE 20 MG/ML
INJECTION, SOLUTION EPIDURAL; INFILTRATION; INTRACAUDAL; PERINEURAL AS NEEDED
Status: DISCONTINUED | OUTPATIENT
Start: 2024-10-23 | End: 2024-10-23

## 2024-10-23 RX ORDER — PROCHLORPERAZINE EDISYLATE 5 MG/ML
10 INJECTION INTRAMUSCULAR; INTRAVENOUS EVERY 6 HOURS PRN
Status: DISCONTINUED | OUTPATIENT
Start: 2024-10-23 | End: 2024-10-24 | Stop reason: HOSPADM

## 2024-10-23 RX ADMIN — HYDROMORPHONE HYDROCHLORIDE 0.5 MG: 1 INJECTION, SOLUTION INTRAMUSCULAR; INTRAVENOUS; SUBCUTANEOUS at 14:30

## 2024-10-23 RX ADMIN — DOCUSATE SODIUM 100 MG: 100 CAPSULE, LIQUID FILLED ORAL at 22:35

## 2024-10-23 RX ADMIN — ACETAMINOPHEN 325MG 650 MG: 325 TABLET ORAL at 18:27

## 2024-10-23 RX ADMIN — NYSTATIN 1 APPLICATION: 100000 POWDER TOPICAL at 22:36

## 2024-10-23 RX ADMIN — TAMSULOSIN HYDROCHLORIDE 0.4 MG: 0.4 CAPSULE ORAL at 22:35

## 2024-10-23 RX ADMIN — ASPIRIN 81 MG: 81 TABLET, COATED ORAL at 22:35

## 2024-10-23 RX ADMIN — ATORVASTATIN CALCIUM 80 MG: 80 TABLET, FILM COATED ORAL at 22:36

## 2024-10-23 RX ADMIN — Medication 3 L/MIN: at 15:01

## 2024-10-23 SDOH — SOCIAL STABILITY: SOCIAL INSECURITY: DOES ANYONE TRY TO KEEP YOU FROM HAVING/CONTACTING OTHER FRIENDS OR DOING THINGS OUTSIDE YOUR HOME?: NO

## 2024-10-23 SDOH — HEALTH STABILITY: MENTAL HEALTH: CURRENT SMOKER: 0

## 2024-10-23 SDOH — SOCIAL STABILITY: SOCIAL INSECURITY: HAVE YOU HAD THOUGHTS OF HARMING ANYONE ELSE?: NO

## 2024-10-23 SDOH — SOCIAL STABILITY: SOCIAL INSECURITY: WITHIN THE LAST YEAR, HAVE YOU BEEN AFRAID OF YOUR PARTNER OR EX-PARTNER?: NO

## 2024-10-23 SDOH — ECONOMIC STABILITY: FOOD INSECURITY: WITHIN THE PAST 12 MONTHS, THE FOOD YOU BOUGHT JUST DIDN'T LAST AND YOU DIDN'T HAVE MONEY TO GET MORE.: NEVER TRUE

## 2024-10-23 SDOH — SOCIAL STABILITY: SOCIAL INSECURITY: HAVE YOU HAD ANY THOUGHTS OF HARMING ANYONE ELSE?: NO

## 2024-10-23 SDOH — ECONOMIC STABILITY: INCOME INSECURITY: IN THE PAST 12 MONTHS HAS THE ELECTRIC, GAS, OIL, OR WATER COMPANY THREATENED TO SHUT OFF SERVICES IN YOUR HOME?: NO

## 2024-10-23 SDOH — SOCIAL STABILITY: SOCIAL INSECURITY: WERE YOU ABLE TO COMPLETE ALL THE BEHAVIORAL HEALTH SCREENINGS?: YES

## 2024-10-23 SDOH — ECONOMIC STABILITY: FOOD INSECURITY: WITHIN THE PAST 12 MONTHS, YOU WORRIED THAT YOUR FOOD WOULD RUN OUT BEFORE YOU GOT THE MONEY TO BUY MORE.: NEVER TRUE

## 2024-10-23 SDOH — SOCIAL STABILITY: SOCIAL INSECURITY: WITHIN THE LAST YEAR, HAVE YOU BEEN HUMILIATED OR EMOTIONALLY ABUSED IN OTHER WAYS BY YOUR PARTNER OR EX-PARTNER?: NO

## 2024-10-23 SDOH — SOCIAL STABILITY: SOCIAL INSECURITY: ARE THERE ANY APPARENT SIGNS OF INJURIES/BEHAVIORS THAT COULD BE RELATED TO ABUSE/NEGLECT?: NO

## 2024-10-23 SDOH — SOCIAL STABILITY: SOCIAL INSECURITY: HAS ANYONE EVER THREATENED TO HURT YOUR FAMILY OR YOUR PETS?: NO

## 2024-10-23 SDOH — SOCIAL STABILITY: SOCIAL INSECURITY: DO YOU FEEL ANYONE HAS EXPLOITED OR TAKEN ADVANTAGE OF YOU FINANCIALLY OR OF YOUR PERSONAL PROPERTY?: NO

## 2024-10-23 SDOH — SOCIAL STABILITY: SOCIAL INSECURITY: ARE YOU OR HAVE YOU BEEN THREATENED OR ABUSED PHYSICALLY, EMOTIONALLY, OR SEXUALLY BY ANYONE?: NO

## 2024-10-23 SDOH — SOCIAL STABILITY: SOCIAL INSECURITY: ABUSE: ADULT

## 2024-10-23 SDOH — SOCIAL STABILITY: SOCIAL INSECURITY: DO YOU FEEL UNSAFE GOING BACK TO THE PLACE WHERE YOU ARE LIVING?: NO

## 2024-10-23 ASSESSMENT — PAIN - FUNCTIONAL ASSESSMENT
PAIN_FUNCTIONAL_ASSESSMENT: 0-10

## 2024-10-23 ASSESSMENT — COGNITIVE AND FUNCTIONAL STATUS - GENERAL
TURNING FROM BACK TO SIDE WHILE IN FLAT BAD: A LITTLE
TOILETING: A LITTLE
DAILY ACTIVITIY SCORE: 18
PERSONAL GROOMING: A LITTLE
DRESSING REGULAR LOWER BODY CLOTHING: A LITTLE
STANDING UP FROM CHAIR USING ARMS: A LITTLE
DRESSING REGULAR UPPER BODY CLOTHING: A LITTLE
EATING MEALS: A LITTLE
MOVING FROM LYING ON BACK TO SITTING ON SIDE OF FLAT BED WITH BEDRAILS: A LITTLE
DAILY ACTIVITIY SCORE: 18
CLIMB 3 TO 5 STEPS WITH RAILING: A LITTLE
MOVING TO AND FROM BED TO CHAIR: A LITTLE
HELP NEEDED FOR BATHING: A LITTLE
TOILETING: A LITTLE
MOVING TO AND FROM BED TO CHAIR: A LITTLE
PATIENT BASELINE BEDBOUND: NO
STANDING UP FROM CHAIR USING ARMS: A LITTLE
CLIMB 3 TO 5 STEPS WITH RAILING: A LITTLE
MOVING FROM LYING ON BACK TO SITTING ON SIDE OF FLAT BED WITH BEDRAILS: A LITTLE
TURNING FROM BACK TO SIDE WHILE IN FLAT BAD: A LITTLE
HELP NEEDED FOR BATHING: A LITTLE
PERSONAL GROOMING: A LITTLE
MOBILITY SCORE: 19
MOBILITY SCORE: 19
EATING MEALS: A LITTLE
DRESSING REGULAR UPPER BODY CLOTHING: A LITTLE
DRESSING REGULAR LOWER BODY CLOTHING: A LITTLE

## 2024-10-23 ASSESSMENT — LIFESTYLE VARIABLES
SKIP TO QUESTIONS 9-10: 1
AUDIT-C TOTAL SCORE: 0
HOW OFTEN DO YOU HAVE A DRINK CONTAINING ALCOHOL: NEVER
HOW MANY STANDARD DRINKS CONTAINING ALCOHOL DO YOU HAVE ON A TYPICAL DAY: PATIENT DOES NOT DRINK
AUDIT-C TOTAL SCORE: 0
HOW OFTEN DO YOU HAVE 6 OR MORE DRINKS ON ONE OCCASION: NEVER

## 2024-10-23 ASSESSMENT — PAIN SCALES - GENERAL
PAINLEVEL_OUTOF10: 3
PAINLEVEL_OUTOF10: 10 - WORST POSSIBLE PAIN
PAINLEVEL_OUTOF10: 10 - WORST POSSIBLE PAIN
PAINLEVEL_OUTOF10: 8
PAINLEVEL_OUTOF10: 10 - WORST POSSIBLE PAIN
PAINLEVEL_OUTOF10: 6
PAINLEVEL_OUTOF10: 5 - MODERATE PAIN
PAINLEVEL_OUTOF10: 3

## 2024-10-23 ASSESSMENT — ACTIVITIES OF DAILY LIVING (ADL)
WALKS IN HOME: INDEPENDENT
GROOMING: NEEDS ASSISTANCE
HEARING - LEFT EAR: FUNCTIONAL
ADEQUATE_TO_COMPLETE_ADL: YES
PATIENT'S MEMORY ADEQUATE TO SAFELY COMPLETE DAILY ACTIVITIES?: YES
DRESSING YOURSELF: NEEDS ASSISTANCE
HEARING - RIGHT EAR: FUNCTIONAL
FEEDING YOURSELF: INDEPENDENT
LACK_OF_TRANSPORTATION: NO
JUDGMENT_ADEQUATE_SAFELY_COMPLETE_DAILY_ACTIVITIES: YES
TOILETING: NEEDS ASSISTANCE
BATHING: NEEDS ASSISTANCE

## 2024-10-23 ASSESSMENT — ENCOUNTER SYMPTOMS
HEMATOLOGIC/LYMPHATIC NEGATIVE: 1
GASTROINTESTINAL NEGATIVE: 1
EYES NEGATIVE: 1
NEUROLOGICAL NEGATIVE: 1
MYALGIAS: 1
RESPIRATORY NEGATIVE: 1
PSYCHIATRIC NEGATIVE: 1
ARTHRALGIAS: 1
BACK PAIN: 1
ACTIVITY CHANGE: 1
ENDOCRINE NEGATIVE: 1
JOINT SWELLING: 1

## 2024-10-23 ASSESSMENT — PAIN DESCRIPTION - DESCRIPTORS
DESCRIPTORS: ACHING
DESCRIPTORS: ACHING;CRAMPING;SHARP;SHOOTING
DESCRIPTORS: ACHING

## 2024-10-23 ASSESSMENT — PATIENT HEALTH QUESTIONNAIRE - PHQ9
2. FEELING DOWN, DEPRESSED OR HOPELESS: NOT AT ALL
1. LITTLE INTEREST OR PLEASURE IN DOING THINGS: NOT AT ALL
SUM OF ALL RESPONSES TO PHQ9 QUESTIONS 1 & 2: 0

## 2024-10-23 ASSESSMENT — COLUMBIA-SUICIDE SEVERITY RATING SCALE - C-SSRS
6. HAVE YOU EVER DONE ANYTHING, STARTED TO DO ANYTHING, OR PREPARED TO DO ANYTHING TO END YOUR LIFE?: NO
2. HAVE YOU ACTUALLY HAD ANY THOUGHTS OF KILLING YOURSELF?: NO
1. IN THE PAST MONTH, HAVE YOU WISHED YOU WERE DEAD OR WISHED YOU COULD GO TO SLEEP AND NOT WAKE UP?: NO

## 2024-10-23 NOTE — ANESTHESIA PREPROCEDURE EVALUATION
Patient: Paco Goss    Procedure Information       Date/Time: 10/23/24 1035    Procedure: Right revision total shoulder arthroplasty. (Right: Shoulder)    Location: [facility] / [facility]    Surgeons: Jhony Nieto MD            Relevant Problems   Cardiac   (+) Athscl heart disease of native coronary artery w/o ang pctrs   (+) Atypical chest pain   (+) Benign essential hypertension      /Renal   (+) Renal stone      Endocrine   (+) Morbid obesity (Multi)      Musculoskeletal   (+) Localized osteoarthrosis   (+) Primary localized osteoarthrosis of shoulder region       Clinical information reviewed:   Tobacco  Allergies  Meds   Med Hx  Surg Hx   Fam Hx  Soc Hx        NPO Detail:  No data recorded     Physical Exam    Airway  Mallampati: III  TM distance: >3 FB  Neck ROM: full  Comments: Video laryngoscopy used recently   Cardiovascular - normal exam     Dental - normal exam     Pulmonary - normal exam     Abdominal - normal exam           Vitals:    10/23/24 0928   BP: 174/82   Pulse: 64   Resp: 16   Temp: 36.2 °C (97.2 °F)   SpO2: 94%       Past Surgical History:   Procedure Laterality Date    BACK SURGERY      OTHER SURGICAL HISTORY  07/29/2019    Appendectomy    OTHER SURGICAL HISTORY  07/29/2019    Angioplasty, WITH STENT    OTHER SURGICAL HISTORY  07/29/2019    Tonsillectomy    OTHER SURGICAL HISTORY  07/29/2019    Gallbladder surgery    TOTAL HIP ARTHROPLASTY Right 2020    TOTAL KNEE ARTHROPLASTY Left 2015     Past Medical History:   Diagnosis Date    Abnormal result of other cardiovascular function study 07/29/2019    Abnormal stress test    BPH (benign prostatic hyperplasia)     Circadian rhythm sleep disorder, unspecified type     Disturbed sleep rhythm    Coronary artery disease     Gout     HISTORY    Hyperlipidemia     Hypertension     Hypoglycemia     HISTORY    Kidney stones     HISTORY    OA (osteoarthritis)     Other forms of angina pectoris 07/29/2019    Angina at rest    Personal  history of other diseases of the circulatory system 07/29/2019    History of abnormal electrocardiography    Personal history of other diseases of the musculoskeletal system and connective tissue     History of chronic back pain    Personal history of other specified conditions 07/29/2019    History of shortness of breath    Personal history of other specified conditions 07/29/2019    History of dizziness     No current facility-administered medications for this encounter.  Prior to Admission medications    Medication Sig Start Date End Date Taking? Authorizing Provider   ascorbic acid (Vitamin C) 500 mg tablet Take 1 tablet (500 mg) by mouth once daily.    Historical Provider, MD   aspirin 81 mg EC tablet Take 1 tablet (81 mg) by mouth once daily.    Historical Provider, MD   atorvastatin (Lipitor) 80 mg tablet Take 1 tablet (80 mg) by mouth once daily at bedtime. 7/2/24 7/2/25  Hadley Odom MD   calcium carbonate (Oscal) 500 mg calcium (1,250 mg) tablet Take 1 tablet (1,250 mg) by mouth once daily.    Historical Provider, MD   cholecalciferol (Vitamin D-3) 25 MCG (1000 UT) capsule Take 1 capsule (25 mcg) by mouth once daily.    Historical Provider, MD   cyanocobalamin (Vitamin B-12) 100 mcg tablet Take 1 tablet (100 mcg) by mouth once daily.    Historical Provider, MD   docusate sodium (Colace) 100 mg capsule Take 1 capsule (100 mg) by mouth 2 times a day for 10 days. Stop taking this medication if you have diarrhea 10/10/24 10/20/24  Alexa Baig PA-C   Flomax 0.4 mg 24 hr capsule Take 1 capsule (0.4 mg) by mouth once daily at bedtime. 9/16/22   Historical Provider, MD   multivitamin with minerals (multivit-min-iron fum-folic ac) tablet Take 1 tablet by mouth once daily.    Historical Provider, MD   OneTouch Ultra Test strip 1 strip if needed. 4/26/24   Historical Provider, MD   OneTouch Ultra2 Meter misc 1 applicator if needed. 4/29/24   Historical Provider, MD   OneTouch UltraSoft Lancets misc 1  Application if needed. 24   Historical Provider, MD   oxyCODONE (Roxicodone) 5 mg immediate release tablet Take 1 tablet (5 mg) by mouth every 6 hours if needed for moderate pain (4 - 6) or severe pain (7 - 10) for up to 7 days. 10/10/24 10/17/24  Alexa Baig PA-C     Allergies   Allergen Reactions    Cat Hair Standardized Allergenic Extract Swelling    Ciprofloxacin Itching    Oxycodone Constipation    Sulfa (Sulfonamide Antibiotics) Itching     Social History     Tobacco Use    Smoking status: Former     Current packs/day: 0.00     Types: Cigarettes     Quit date:      Years since quittin.8    Smokeless tobacco: Never   Substance Use Topics    Alcohol use: Yes     Comment: OCCASIONAL         Chemistry    Lab Results   Component Value Date/Time     10/10/2024 0440    K 4.3 10/10/2024 0440     10/10/2024 0440    CO2 24 10/10/2024 0440    BUN 16 10/10/2024 0440    CREATININE 0.79 10/10/2024 0440    Lab Results   Component Value Date/Time    CALCIUM 8.3 (L) 10/10/2024 0440          Lab Results   Component Value Date/Time    WBC 9.7 10/10/2024 0440    HGB 11.9 (L) 10/10/2024 0440    HCT 38.6 (L) 10/10/2024 0440     10/10/2024 0440     Lab Results   Component Value Date/Time    PROTIME 12.0 2024 0741    INR 1.1 202441     Encounter Date: 24   ECG 12 lead (Clinic Performed)    Narrative    Sinus Rhythm, HR 73 bpm        Anesthesia Plan    History of general anesthesia?: yes  History of complications of general anesthesia?: no    ASA 3     general and regional     The patient is not a current smoker.    intravenous induction   Anesthetic plan and risks discussed with patient.    Plan discussed with [facility].

## 2024-10-23 NOTE — H&P
History Of Present Illness  Paco Goss is a 74 y.o. male presenting with failure of right total shoulder patient.  He has history significant for BPH hypoglycemia chronic back pain hyperlipidemia CAD and obesity.  Underwent right total shoulder arthroplasty on 10/9/2024, and was discharged to home in satisfactory condition.  He states that the time he had only expected postoperative pain that was managed with oxycodone and Tylenol.  Patient states he noted some drainage from surgical incision and swelling around surgical area 3 to 4 days postoperatively.  He called on-call physician and reviewed symptoms was told to call office on Monday if symptoms persisted.  On Tuesday he was seen in the office by Dr. Nieto.  X-rays were obtained and noted to have failure of implant.  Swelling from shoulder to right hand limiting movement and elbow fingers and wrist of right hand.  Reports itching to entire right upper extremity, with rash in right axilla.  Denies any recent fever or chills.  No complaints of chest pain, shortness of breath, lightheaded or dizziness.  Does state that he has had bilateral lower extremity swelling over the past few days which she has not had previously.  Also noticed bruising mid abdomen and states no injury.  Currently rating pain in right shoulder area approximately 2-3 out of 10 reports is not significant, nonradiating, increases with any movement or activity and decreases with rest.  Patient was seen in PACU at [facility] in anticipation of revision right total shoulder arthroplasty.     Past Medical History  He has a past medical history of Abnormal result of other cardiovascular function study (07/29/2019), BPH (benign prostatic hyperplasia), Circadian rhythm sleep disorder, unspecified type, Coronary artery disease, Gout, Hyperlipidemia, Hypertension, Hypoglycemia, Kidney stones, OA (osteoarthritis), Other forms of angina pectoris (07/29/2019), Personal history of other  diseases of the circulatory system (07/29/2019), Personal history of other diseases of the musculoskeletal system and connective tissue, Personal history of other specified conditions (07/29/2019), and Personal history of other specified conditions (07/29/2019).    Surgical History  He has a past surgical history that includes Other surgical history (07/29/2019); Other surgical history (07/29/2019); Other surgical history (07/29/2019); Other surgical history (07/29/2019); Total hip arthroplasty (Right, 2020); Total knee arthroplasty (Left, 2015); and Back surgery.     Social History  He reports that he quit smoking about 29 years ago. His smoking use included cigarettes. He has never used smokeless tobacco. He reports current alcohol use. He reports that he does not use drugs.    Family History  Family History   Problem Relation Name Age of Onset    Diabetes Father          Allergies  Cat hair standardized allergenic extract, Ciprofloxacin, Oxycodone, and Sulfa (sulfonamide antibiotics)    Review of Systems   Constitutional:  Positive for activity change.   HENT: Negative.     Eyes: Negative.    Respiratory: Negative.     Cardiovascular:  Positive for leg swelling.   Gastrointestinal: Negative.    Endocrine: Negative.    Genitourinary: Negative.    Musculoskeletal:  Positive for arthralgias, back pain, joint swelling and myalgias.   Skin:  Positive for rash.   Allergic/Immunologic: Positive for environmental allergies.   Neurological: Negative.    Hematological: Negative.    Psychiatric/Behavioral: Negative.          Physical Exam  Constitutional:       Appearance: Normal appearance. He is obese.   HENT:      Head: Normocephalic and atraumatic.      Nose: Nose normal.      Mouth/Throat:      Mouth: Mucous membranes are moist.   Eyes:      Extraocular Movements: Extraocular movements intact.      Pupils: Pupils are equal, round, and reactive to light.   Cardiovascular:      Rate and Rhythm: Normal rate and regular  "rhythm.   Pulmonary:      Effort: Pulmonary effort is normal.      Breath sounds: Normal breath sounds.   Abdominal:      Palpations: Abdomen is soft.      Comments: Abdomen is obese with large pannus, under pannus erythematous papular shiny rash, central nontender ecchymosis over abdomen   Musculoskeletal:      Cervical back: Neck supple.      Comments: Right shoulder area with surgical incision macerated, small amount of yellow drainage on dressing, tender to palpation, sensation present throughout right upper extremity, pitting edema throughout right upper extremity decreased range of motion at wrist and fingers of right hand due to edema, radial pulse palpable  Bilateral lower extremity pitting edema  Range of motion in left upper and bilateral lower extremities intact   Skin:     General: Skin is warm and dry.      Findings: Bruising, erythema and rash present.   Neurological:      General: No focal deficit present.      Mental Status: He is alert.   Psychiatric:         Mood and Affect: Mood normal.          Last Recorded Vitals  Blood pressure 174/82, pulse 64, temperature 36.2 °C (97.2 °F), temperature source Temporal, resp. rate 18, height 1.778 m (5' 10\"), weight (!) 154 kg (340 lb), SpO2 94%.    Relevant Results      Scheduled medications    Continuous medications    PRN medications    No results found for this or any previous visit (from the past 24 hours).    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 Nieto and/or anesthesiology, CBC differential and CMP ordered       I spent 30 minutes in the professional and overall care of this patient.      Megan Izquierdo PA-C    "

## 2024-10-23 NOTE — OP NOTE
Right revision total shoulder arthroplasty. (R) Operative Note     Date: 10/23/2024  OR Location: [facility] OR    Name: Paco Goss : 1950, Age: 74 y.o., MRN: [id number], Sex: male    Diagnosis  Pre-op Diagnosis      * Arthritis of right shoulder region [M19.011]     * Presence of right artificial shoulder joint [Z96.611] Post-op Diagnosis     * Arthritis of right shoulder region [M19.011]     * Presence of right artificial shoulder joint [Z96.611]     Procedures  Right revision total shoulder arthroplasty.  92144 - LA JYOTHI SHOULDER ARTHRPLSTY HUMERAL/GLENOID COMPNT      Surgeons      * Jhony Nieto - Primary    Resident/Fellow/Other Assistant:  Surgeons and Role:  * No surgeons found with a matching role *    Procedure Summary  Anesthesia: Regional, General  ASA: III  Anesthesia Staff: Anesthesiologist: Klaus Mcclain MD  C-AA: CAYLA Patterson  Estimated Blood Loss: 50mL  Intra-op Medications:   Administrations occurring from 1035 to 1150 on 10/23/24:   Medication Name Total Dose   dexAMETHasone (Decadron) injection 4 mg/mL 4 mg   fentaNYL (Sublimaze) injection 50 mcg/mL 50 mcg   LR bolus Cannot be calculated   lidocaine PF (Xylocaine-MPF) local injection 2 % 100 mg   midazolam (Versed) injection 1 mg/mL 2 mg   propofol (Diprivan) injection 10 mg/mL 200 mg   rocuronium (Zemuron) 50 mg/5 mL prefilled syringe 90 mg   ceFAZolin (Ancef) 3 g in dextrose 5%  mL 3 g              Anesthesia Record               Intraprocedure I/O Totals       None           Specimen:   ID Type Source Tests Collected by Time   A :  Swab SHOULDER ARTHROPLASTY RIGHT TISSUE/WOUND CULTURE/SMEAR Jhony Nieto MD 10/23/2024 1237   B :  Swab SHOULDER ARTHROPLASTY RIGHT TISSUE/WOUND CULTURE/SMEAR Jhony Nieto MD 10/23/2024 1254        Staff:   Circulator: Leighann Ordaz Person: Heena Ordaz Person: Hadley Ordaz Person: Sylvester Espinoza Circulator: Sarah Espinoza Scrub: Jamee         Drains and/or Catheters: * None in log  *    Tourniquet Times:         Implants:  Implants       Type Name Action Serial No.       Perform Humeral System Humeral head - CoCr Implanted [id number]      Humeral Head  Shoulder Implant Implanted [id number]      Humeral Stem, PLUS, LONG [id number] + Ti Implanted [id number]              Findings: Right proximal humeral periprosthetic fracture with loose humeral component    Indications: Paco Goss is an 74 y.o. male who is having surgery for M19.011  Z96.611.     The patient was seen in the preoperative area. The risks, benefits, complications, treatment options, non-operative alternatives, expected recovery and outcomes were discussed with the patient. The possibilities of reaction to medication, pulmonary aspiration, injury to surrounding structures, bleeding, recurrent infection, the need for additional procedures, failure to diagnose a condition, and creating a complication requiring transfusion or operation were discussed with the patient. The patient concurred with the proposed plan, giving informed consent.  The site of surgery was properly noted/marked if necessary per policy. The patient has been actively warmed in preoperative area. Preoperative antibiotics have been ordered and given within 1 hours of incision. Venous thrombosis prophylaxis have been ordered including bilateral sequential compression devices and chemical prophylaxis    Procedure Details: Left revision anatomic total shoulder arthroplasty.      Complications:  None; patient tolerated the procedure well.    Disposition: PACU - hemodynamically stable.  Condition: stable     77-year of age male who is postoperative right anatomic total shoulder arthroplasty had presented with increased swelling and pain in the right upper extremity.  He was found to have a right proximal humerus fracture with loose humeral component.  Risks and benefits of revision humeral component and total shoulder arthroplasty were reviewed with he and his wife.   Patient was informed of risk to include but not limited to infection both perioperative and delayed, wound complications, periprosthetic fracture, loosening the implant, need for revision surgery, DVT, pulmonary embolus, neurovascular injury, complex regional pain syndrome, medical risks and anesthetic risk.  All questions were answered and consent was obtained.    Patient's BMI is 49.  Given the body habitus and size of the patient added complexity and time to the procedure.  Made the exposure more difficult.  Made the procedure and technical aspects of the procedure more difficult and time-consuming.    Patient was brought to the operating where after induction of general anesthesia and regional anesthesia was placed in a beachchair position.  Right upper extremities then prepped and draped in usual sterile fashion.  Patient was given 3 g of cefazolin for prophylactic antibiotics.  Timeout was taken confirming surgical site, procedure, instrumentation, fire risk, and antibiotics.    15 blade was used to make a incision through the prior incisional scar for a standard deltopectoral approach to the shoulder.  Sharp dissection was carried down through skin subcutaneous tissue.  Full-thickness skin flaps were developed.  The deltopectoral interval was identified.  Dissection was performed through the deltopectoral interval and prior sutures were removed.  There is no evidence of purulence.  Mild hematoma was evacuated.  No signs of infection.  Once entering the deep tissue cultures were taken due to the revision nature of the procedure.  Retractors were placed.  The humerus was subluxed.  There is found to be a periprosthetic fracture proximally.  The implant was loose and displaced posteriorly.  The implant was removed.  The glenoid was intact without any evidence of loosening.  Using sequential reamers the canal was reamed.  Using longer Tornier perform humeral stem this was broached.  Intraoperative x-ray was taken  which showed excellent extension past the fracture site and stability.  He was decided to proceed with the longer bigger stem.  The broach was removed.  Next a Tornier perform humeral size 4+ stem was placed.  This was again x-rayed intraoperatively and confirmed to be in appropriate position.  Trial was performed as felt to be most stable with a 48 x 16 humeral head.  This was then placed and reduced and taken through range of motion felt to be stable throughout range of motion.  Copious irrigation was then performed with surgery for an Irrisept irrigation.  Hemostasis was ensured.  The subscapularis and capsule were then repaired laterally with #2 Orthocord figure-of-eight sutures.  The deltopectoral interval was loosely closed with 0 Vicryl figure-of-eight sutures.  Dermal layer was closed with strata fix and the skin was approximated with strata fix.  Sterile dressings were applied.  All sponge and instrument counts were correct at the end of the case.        Attending Attestation:     Jhony Nieto  Phone Number: [phone]

## 2024-10-23 NOTE — ANESTHESIA POSTPROCEDURE EVALUATION
Patient: Paco Goss    Procedure Summary       Date: 10/23/24 Room / Location: [facility] OR 09 / [facility]    Anesthesia Start: 1104 Anesthesia Stop: 1342    Procedure: Right revision total shoulder arthroplasty. (Right: Shoulder) Diagnosis:       Arthritis of right shoulder region      Presence of right artificial shoulder joint      (M19.011)      (Z96.611)    Surgeons: Jhony Nieto MD Responsible Provider: Klaus Mcclain MD    Anesthesia Type: general, regional ASA Status: 3            Anesthesia Type: general, regional    Vitals Value Taken Time   /76 10/23/24 1500   Temp 37 °C (98.6 °F) 10/23/24 1340   Pulse 60 10/23/24 1501   Resp 14 10/23/24 1501   SpO2 96 % 10/23/24 1501   Vitals shown include unfiled device data.    Anesthesia Post Evaluation    Patient location during evaluation: PACU  Patient participation: complete - patient participated  Level of consciousness: sleepy but conscious  Pain management: satisfactory to patient  Multimodal analgesia pain management approach  Airway patency: patent  Cardiovascular status: acceptable  Respiratory status: acceptable  Hydration status: euvolemic  Postoperative Nausea and Vomiting: none        No notable events documented.

## 2024-10-23 NOTE — CARE PLAN
Problem: Pain - Adult  Goal: Verbalizes/displays adequate comfort level or baseline comfort level  Outcome: Progressing     Problem: Safety - Adult  Goal: Free from fall injury  Outcome: Progressing     Problem: Discharge Planning  Goal: Discharge to home or other facility with appropriate resources  Outcome: Progressing     Problem: Chronic Conditions and Co-morbidities  Goal: Patient's chronic conditions and co-morbidity symptoms are monitored and maintained or improved  Outcome: Progressing     Problem: Skin  Goal: Promote skin healing  Outcome: Progressing     Problem: Pain  Goal: Takes deep breaths with improved pain control throughout the shift  Outcome: Progressing

## 2024-10-23 NOTE — ANESTHESIA PROCEDURE NOTES
Airway  Date/Time: 10/23/2024 11:17 AM  Urgency: elective    Airway not difficult    Staffing  Performed: CAYLA   Authorized by: Klaus Mcclain MD    Performed by: CAYLA Patterson  Patient location during procedure: OR    Indications and Patient Condition  Indications for airway management: anesthesia  Spontaneous Ventilation: absent  Sedation level: deep  Preoxygenated: yes  Patient position: sniffing  Mask difficulty assessment: 1 - vent by mask  Planned trial extubation    Final Airway Details  Final airway type: endotracheal airway      Successful airway: ETT  Cuffed: yes   Successful intubation technique: video laryngoscopy  Facilitating devices/methods: intubating stylet  Blade: Nu  Blade size: #4  ETT size (mm): 8.0  Cormack-Lehane Classification: grade I - full view of glottis  Placement verified by: chest auscultation and capnometry   Measured from: lips  ETT to lips (cm): 23  Number of attempts at approach: 1

## 2024-10-23 NOTE — ANESTHESIA PROCEDURE NOTES
Peripheral Block    Patient location during procedure: post-op  Start time: 10/23/2024 2:01 PM  End time: 10/23/2024 2:08 PM  Reason for block: at surgeon's request and post-op pain management  Staffing  Performed: attending   Authorized by: Klaus Mcclain MD    Performed by: Klaus Mcclain MD  Preanesthetic Checklist  Completed: patient identified, IV checked, site marked, risks and benefits discussed, surgical consent, monitors and equipment checked, pre-op evaluation and timeout performed   Timeout performed at: 10/23/2024 2:00 PM  Peripheral Block  Patient position: Semi-sitting, with head turned to the L.  Prep: ChloraPrep  Patient monitoring: heart rate, cardiac monitor and continuous pulse ox  Block type: interscalene  Laterality: right  Injection technique: single-shot  Guidance: ultrasound guided  Local infiltration: ropivacaine  Infiltration strength: 0.5 %  Dose: 15 mL  Needle  Needle gauge: 21 G  Needle length: 10 cm  Needle localization: ultrasound guidance  Assessment  Injection assessment: negative aspiration for heme, no paresthesia on injection, incremental injection and local visualized surrounding nerve on ultrasound  Paresthesia pain: none  Heart rate change: no  Slow fractionated injection: yes  Additional Notes  Risks and benefits of the procedure have been extensively discussed. Patient seems to understand and is willing to proceed.   Postoperative placement in PACU.  Technically extremely challenging study secondary to patient's body habitus and inability to position optimally with surgical dressing and  shoulder sling in situ. Difficult visualization of the relevant anatomic structures.  21G 100 mm needle has been advanced toward neurovascular bundle approximately 4-5 cm above the clavicle. Needle tip placed at 11 o'clock  relatively to C5-6. Negative aspiration followed by incremental injection of LA mixture.  10 mg of PF Dexamethasone and 100 mcg Clonidine were mixed with the local  anesthetic.   Tip of the needle remained visible throughout the entire procedure. Appropriate spread of the local anesthetic mixture has been observed and documented. No immediate complications.

## 2024-10-24 VITALS
TEMPERATURE: 97.9 F | DIASTOLIC BLOOD PRESSURE: 61 MMHG | RESPIRATION RATE: 17 BRPM | SYSTOLIC BLOOD PRESSURE: 125 MMHG | WEIGHT: 315 LBS | HEART RATE: 71 BPM | OXYGEN SATURATION: 95 % | BODY MASS INDEX: 45.1 KG/M2 | HEIGHT: 70 IN

## 2024-10-24 LAB
ANION GAP SERPL CALC-SCNC: 13 MMOL/L (ref 10–20)
BUN SERPL-MCNC: 16 MG/DL (ref 6–23)
CALCIUM SERPL-MCNC: 8.7 MG/DL (ref 8.6–10.3)
CHLORIDE SERPL-SCNC: 103 MMOL/L (ref 98–107)
CO2 SERPL-SCNC: 25 MMOL/L (ref 21–32)
CREAT SERPL-MCNC: 0.72 MG/DL (ref 0.5–1.3)
EGFRCR SERPLBLD CKD-EPI 2021: >90 ML/MIN/1.73M*2
ERYTHROCYTE [DISTWIDTH] IN BLOOD BY AUTOMATED COUNT: 14.6 % (ref 11.5–14.5)
GLUCOSE SERPL-MCNC: 133 MG/DL (ref 74–99)
HCT VFR BLD AUTO: 35.8 % (ref 41–52)
HGB BLD-MCNC: 11.2 G/DL (ref 13.5–17.5)
MCH RBC QN AUTO: 28.4 PG (ref 26–34)
MCHC RBC AUTO-ENTMCNC: 31.3 G/DL (ref 32–36)
MCV RBC AUTO: 91 FL (ref 80–100)
NRBC BLD-RTO: 0 /100 WBCS (ref 0–0)
PLATELET # BLD AUTO: 291 X10*3/UL (ref 150–450)
POTASSIUM SERPL-SCNC: 4.2 MMOL/L (ref 3.5–5.3)
RBC # BLD AUTO: 3.94 X10*6/UL (ref 4.5–5.9)
SODIUM SERPL-SCNC: 137 MMOL/L (ref 136–145)
WBC # BLD AUTO: 14 X10*3/UL (ref 4.4–11.3)

## 2024-10-24 PROCEDURE — 2500000001 HC RX 250 WO HCPCS SELF ADMINISTERED DRUGS (ALT 637 FOR MEDICARE OP): Performed by: ORTHOPAEDIC SURGERY

## 2024-10-24 PROCEDURE — 2500000004 HC RX 250 GENERAL PHARMACY W/ HCPCS (ALT 636 FOR OP/ED): Performed by: PHYSICIAN ASSISTANT

## 2024-10-24 PROCEDURE — 36415 COLL VENOUS BLD VENIPUNCTURE: CPT | Performed by: ORTHOPAEDIC SURGERY

## 2024-10-24 PROCEDURE — 97165 OT EVAL LOW COMPLEX 30 MIN: CPT | Mod: GO | Performed by: OCCUPATIONAL THERAPIST

## 2024-10-24 PROCEDURE — 97535 SELF CARE MNGMENT TRAINING: CPT | Mod: GO | Performed by: OCCUPATIONAL THERAPIST

## 2024-10-24 PROCEDURE — 2500000001 HC RX 250 WO HCPCS SELF ADMINISTERED DRUGS (ALT 637 FOR MEDICARE OP): Performed by: PHYSICIAN ASSISTANT

## 2024-10-24 PROCEDURE — 7100000011 HC EXTENDED STAY RECOVERY HOURLY - NURSING UNIT

## 2024-10-24 PROCEDURE — 2500000004 HC RX 250 GENERAL PHARMACY W/ HCPCS (ALT 636 FOR OP/ED): Performed by: ORTHOPAEDIC SURGERY

## 2024-10-24 PROCEDURE — 99238 HOSP IP/OBS DSCHRG MGMT 30/<: CPT | Performed by: PHYSICIAN ASSISTANT

## 2024-10-24 PROCEDURE — 80048 BASIC METABOLIC PNL TOTAL CA: CPT | Performed by: ORTHOPAEDIC SURGERY

## 2024-10-24 PROCEDURE — 85027 COMPLETE CBC AUTOMATED: CPT | Performed by: ORTHOPAEDIC SURGERY

## 2024-10-24 RX ORDER — OXYCODONE HYDROCHLORIDE 5 MG/1
5 TABLET ORAL EVERY 6 HOURS PRN
Qty: 28 TABLET | Refills: 0 | Status: SHIPPED | OUTPATIENT
Start: 2024-10-24 | End: 2024-10-31

## 2024-10-24 RX ORDER — ASPIRIN 81 MG/1
81 TABLET ORAL 2 TIMES DAILY
Qty: 60 TABLET | Refills: 0 | Status: SHIPPED | OUTPATIENT
Start: 2024-10-24 | End: 2024-11-23

## 2024-10-24 RX ORDER — DOCUSATE SODIUM 100 MG/1
100 CAPSULE, LIQUID FILLED ORAL 2 TIMES DAILY
Qty: 20 CAPSULE | Refills: 0 | Status: SHIPPED | OUTPATIENT
Start: 2024-10-24 | End: 2024-11-03

## 2024-10-24 RX ADMIN — DEXTROSE MONOHYDRATE 3 G: 5 INJECTION INTRAVENOUS at 02:15

## 2024-10-24 RX ADMIN — ACETAMINOPHEN 325MG 650 MG: 325 TABLET ORAL at 01:00

## 2024-10-24 RX ADMIN — OXYCODONE HYDROCHLORIDE 5 MG: 5 TABLET ORAL at 04:42

## 2024-10-24 RX ADMIN — POLYETHYLENE GLYCOL 3350 17 G: 17 POWDER, FOR SOLUTION ORAL at 08:09

## 2024-10-24 RX ADMIN — DEXTROSE MONOHYDRATE 3 G: 5 INJECTION INTRAVENOUS at 11:06

## 2024-10-24 RX ADMIN — OXYCODONE HYDROCHLORIDE 5 MG: 5 TABLET ORAL at 11:09

## 2024-10-24 RX ADMIN — ACETAMINOPHEN 325MG 650 MG: 325 TABLET ORAL at 05:51

## 2024-10-24 RX ADMIN — ASPIRIN 81 MG: 81 TABLET, COATED ORAL at 08:09

## 2024-10-24 RX ADMIN — DOCUSATE SODIUM 100 MG: 100 CAPSULE, LIQUID FILLED ORAL at 08:09

## 2024-10-24 RX ADMIN — CALCIUM 1250 MG: 500 TABLET ORAL at 08:09

## 2024-10-24 RX ADMIN — NYSTATIN 1 APPLICATION: 100000 POWDER TOPICAL at 08:09

## 2024-10-24 RX ADMIN — ACETAMINOPHEN 325MG 650 MG: 325 TABLET ORAL at 11:08

## 2024-10-24 ASSESSMENT — COGNITIVE AND FUNCTIONAL STATUS - GENERAL
DAILY ACTIVITIY SCORE: 20
HELP NEEDED FOR BATHING: A LITTLE
DRESSING REGULAR LOWER BODY CLOTHING: A LITTLE
WALKING IN HOSPITAL ROOM: A LITTLE
TOILETING: A LITTLE
EATING MEALS: A LITTLE
MOVING TO AND FROM BED TO CHAIR: A LITTLE
DAILY ACTIVITIY SCORE: 18
STANDING UP FROM CHAIR USING ARMS: A LITTLE
PERSONAL GROOMING: A LITTLE
DRESSING REGULAR LOWER BODY CLOTHING: A LITTLE
HELP NEEDED FOR BATHING: A LITTLE
TURNING FROM BACK TO SIDE WHILE IN FLAT BAD: A LITTLE
MOVING FROM LYING ON BACK TO SITTING ON SIDE OF FLAT BED WITH BEDRAILS: A LITTLE
CLIMB 3 TO 5 STEPS WITH RAILING: A LITTLE
MOBILITY SCORE: 18
DRESSING REGULAR UPPER BODY CLOTHING: A LITTLE
DRESSING REGULAR UPPER BODY CLOTHING: A LOT

## 2024-10-24 ASSESSMENT — PAIN SCALES - GENERAL
PAINLEVEL_OUTOF10: 4
PAINLEVEL_OUTOF10: 2
PAINLEVEL_OUTOF10: 5 - MODERATE PAIN
PAINLEVEL_OUTOF10: 6
PAINLEVEL_OUTOF10: 3
PAINLEVEL_OUTOF10: 0 - NO PAIN

## 2024-10-24 ASSESSMENT — PAIN - FUNCTIONAL ASSESSMENT
PAIN_FUNCTIONAL_ASSESSMENT: 0-10

## 2024-10-24 ASSESSMENT — PAIN DESCRIPTION - LOCATION: LOCATION: SHOULDER

## 2024-10-24 ASSESSMENT — PAIN DESCRIPTION - ORIENTATION: ORIENTATION: RIGHT

## 2024-10-24 ASSESSMENT — ACTIVITIES OF DAILY LIVING (ADL): HOME_MANAGEMENT_TIME_ENTRY: 8

## 2024-10-24 NOTE — PROGRESS NOTES
Occupational Therapy  Evaluation    Patient Name: Paco Goss  MRN: [id number]  Today's Date: 10/24/2024  Time Calculation  Start Time: 0912  Stop Time: 0935  Time Calculation (min): 23 min  4127/4127-A    Assessment  IP OT Assessment  OT Assessment: Patient demonstrates decreased independence with self care. Wife able to assist as needed.  Anticipate safe discharge to prior level of living with low intensity therapy.  Prognosis: Good  Barriers to Discharge: None  Evaluation/Treatment Tolerance: Patient tolerated treatment well  Medical Staff Made Aware: Yes  End of Session Communication: Bedside nurse  End of Session Patient Position: Up in chair, Alarm on    Plan:  Treatment Interventions: ADL retraining, Functional transfer training, UE strengthening/ROM, Patient/family training  No Skilled OT: Safe to return home  OT Frequency: OT eval only  OT Discharge Recommendations: No further acute OT  OT Recommended Transfer Status: Independent  OT - OK to Discharge: Yes (to next level of care when medically cleared by physician)    Subjective     Current Problem:  1. Complication of procedure, subsequent encounter  aspirin 81 mg EC tablet    docusate sodium (Colace) 100 mg capsule    oxyCODONE (Roxicodone) 5 mg immediate release tablet      2. Arthritis of right shoulder region  Tissue/Wound Culture/Smear    Tissue/Wound Culture/Smear    Tissue/Wound Culture/Smear    Tissue/Wound Culture/Smear      3. Presence of right artificial shoulder joint  Tissue/Wound Culture/Smear    Tissue/Wound Culture/Smear    Tissue/Wound Culture/Smear    Tissue/Wound Culture/Smear          General:  General  Reason for Referral: impaired self care  Referred By: Dr. Nieto  Past Medical History Relevant to Rehab: right TSA, now with revision, BPH, hypoglycemia, HLD, CAD, obesity, chronic back pain  Co-Treatment: PT  Co-Treatment Reason: discharge planning  Prior to Session Communication: Bedside nurse  Patient Position Received: Up in chair,  Alarm on  General Comment: Patient agreeable to therapy.    Precautions:  UE Weight Bearing Status: Right Non-Weight Bearing  Medical Precautions: Fall precautions  Post-Surgical Precautions: Right shoulder precautions (no AROM of right shoulder, ROM to elbow, wrist and hand only, brace at all times)  Braces Applied: right shoulder brace in place      Pain:  Pain Assessment  Pain Assessment: 0-10  0-10 (Numeric) Pain Score: 0 - No pain    Objective     Cognition:  Orientation Level: Oriented X4        Home Living:  Type of Home: House  Lives With: Spouse  Home Adaptive Equipment: Walker rolling or standard, Cane  Home Layout: One level  Home Access: Stairs to enter with rails  Entrance Stairs-Number of Steps: 2  Bathroom Shower/Tub: Walk-in shower  Bathroom Equipment: Grab bars in shower, Shower chair with back     Prior Function:  Prior Function Comments: Patient independent but wife has been assisting as needed due to recent shoulder surgery.           ADL:  UE Dressing Assistance: Moderate  LE Dressing Assistance: Minimal  ADL Comments: Wife able to assist as needed.    Activity Tolerance:  Endurance: Endurance does not limit participation in activity    Bed Mobility/Transfers:   Bed Mobility  Bed Mobility: No  Transfers  Transfer: Yes  Transfer 1  Transfer From 1: Sit to  Transfer to 1: Stand  Technique 1: Sit to stand, Stand to sit  Transfer Level of Assistance 1: Independent  Transfers 2  Transfer From 2: Chair with arms to  Transfer to 2: Chair with arms  Technique 2: Sit to stand, Stand to sit  Transfer Level of Assistance 2: Independent    Ambulation/Gait Training:  Functional Mobility  Functional Mobility Performed: Yes (Patient ambulated with independence.)      Strength:  Strength Comments: left UE WFL, right not tested        Outcome Measures: [facility] Daily Activity  Putting on and taking off regular lower body clothing: A little  Bathing (including washing, rinsing, drying): A little  Putting on and  taking off regular upper body clothing: A lot  Toileting, which includes using toilet, bedpan or urinal: None  Taking care of personal grooming such as brushing teeth: None  Eating Meals: None  Daily Activity - Total Score: 20           EDUCATION:  Education  Individual(s) Educated: Patient, Spouse  Education Provided: Fall precautons, Risk and benefits of OT discussed with patient or other  Plan of Care Discussed and Agreed Upon: yes  Patient Response to Education: Patient/Caregiver Verbalized Understanding of Information      Goals: No goals

## 2024-10-24 NOTE — PROGRESS NOTES
"Paco Goss is a 74 y.o. male on day 0 of admission presenting with Osteoarthritis of right shoulder, unspecified osteoarthritis type.    Subjective   Patient doing well.  Tolerating oral diet.  Good pain control with oral pain meds.  Denies chest pain, shortness of breath or lightheadedness.  Describes swelling in the right upper extremity is improved.       Objective     Physical Exam  Sitting up in a chair.  Comfortable no acute distress.  Alert and oriented x 3.  Breath sounds equal bilaterally.  Regular rate and rhythm.  Abdomen soft nontender nondistended.  Right upper extremity dressing intact.  Axillary patch intact.  Radial, ulnar, median nerve motor and sensory distribution are intact.  Radial pulse palpable and symmetric bilaterally.    Last Recorded Vitals  Blood pressure 108/64, pulse 68, temperature 36.4 °C (97.5 °F), temperature source Temporal, resp. rate 18, height 1.778 m (5' 10\"), weight (!) 154 kg (340 lb), SpO2 94%.  Intake/Output last 3 Shifts:  I/O last 3 completed shifts:  In: 800 (5.2 mL/kg) [IV Piggyback:800]  Out: 800 (5.2 mL/kg) [Urine:800 (0.1 mL/kg/hr)]  Weight: 154.2 kg     Relevant Results      Scheduled medications  acetaminophen, 650 mg, oral, q6h RICKI  aspirin, 81 mg, oral, BID  atorvastatin, 80 mg, oral, Nightly  calcium carbonate, 1,250 mg, oral, Daily  ceFAZolin, 3 g, intravenous, q8h  docusate sodium, 100 mg, oral, BID  nystatin, 1 Application, Topical, BID  polyethylene glycol, 17 g, oral, BID  tamsulosin, 0.4 mg, oral, Nightly      Continuous medications  lactated Ringer's, 50 mL/hr      PRN medications  PRN medications: bisacodyl, cyclobenzaprine, morphine, ondansetron ODT **OR** ondansetron, oxyCODONE, oxyCODONE, prochlorperazine **OR** prochlorperazine **OR** prochlorperazine  Results for orders placed or performed during the hospital encounter of 10/23/24 (from the past 24 hours)   CBC and Auto Differential   Result Value Ref Range    WBC 9.1 4.4 - 11.3 x10*3/uL    nRBC 0.0 " 0.0 - 0.0 /100 WBCs    RBC 4.17 (L) 4.50 - 5.90 x10*6/uL    Hemoglobin 11.8 (L) 13.5 - 17.5 g/dL    Hematocrit 37.6 (L) 41.0 - 52.0 %    MCV 90 80 - 100 fL    MCH 28.3 26.0 - 34.0 pg    MCHC 31.4 (L) 32.0 - 36.0 g/dL    RDW 14.7 (H) 11.5 - 14.5 %    Platelets 268 150 - 450 x10*3/uL    Neutrophils % 72.2 40.0 - 80.0 %    Immature Granulocytes %, Automated 0.4 0.0 - 0.9 %    Lymphocytes % 15.5 13.0 - 44.0 %    Monocytes % 5.4 2.0 - 10.0 %    Eosinophils % 5.5 0.0 - 6.0 %    Basophils % 1.0 0.0 - 2.0 %    Neutrophils Absolute 6.57 (H) 1.60 - 5.50 x10*3/uL    Immature Granulocytes Absolute, Automated 0.04 0.00 - 0.50 x10*3/uL    Lymphocytes Absolute 1.41 0.80 - 3.00 x10*3/uL    Monocytes Absolute 0.49 0.05 - 0.80 x10*3/uL    Eosinophils Absolute 0.50 (H) 0.00 - 0.40 x10*3/uL    Basophils Absolute 0.09 0.00 - 0.10 x10*3/uL   Comprehensive metabolic panel   Result Value Ref Range    Glucose 94 74 - 99 mg/dL    Sodium 139 136 - 145 mmol/L    Potassium 4.3 3.5 - 5.3 mmol/L    Chloride 106 98 - 107 mmol/L    Bicarbonate 27 21 - 32 mmol/L    Anion Gap 10 10 - 20 mmol/L    Urea Nitrogen 15 6 - 23 mg/dL    Creatinine 0.73 0.50 - 1.30 mg/dL    eGFR >90 >60 mL/min/1.73m*2    Calcium 8.8 8.6 - 10.3 mg/dL    Albumin 3.5 3.4 - 5.0 g/dL    Alkaline Phosphatase 41 33 - 136 U/L    Total Protein 6.4 6.4 - 8.2 g/dL    AST 23 9 - 39 U/L    Bilirubin, Total 0.6 0.0 - 1.2 mg/dL    ALT 28 10 - 52 U/L   Lavender Top   Result Value Ref Range    Extra Tube Hold for add-ons.    Basic metabolic panel   Result Value Ref Range    Glucose 133 (H) 74 - 99 mg/dL    Sodium 137 136 - 145 mmol/L    Potassium 4.2 3.5 - 5.3 mmol/L    Chloride 103 98 - 107 mmol/L    Bicarbonate 25 21 - 32 mmol/L    Anion Gap 13 10 - 20 mmol/L    Urea Nitrogen 16 6 - 23 mg/dL    Creatinine 0.72 0.50 - 1.30 mg/dL    eGFR >90 >60 mL/min/1.73m*2    Calcium 8.7 8.6 - 10.3 mg/dL   CBC POD 1   Result Value Ref Range    WBC 14.0 (H) 4.4 - 11.3 x10*3/uL    nRBC 0.0 0.0 - 0.0 /100  WBCs    RBC 3.94 (L) 4.50 - 5.90 x10*6/uL    Hemoglobin 11.2 (L) 13.5 - 17.5 g/dL    Hematocrit 35.8 (L) 41.0 - 52.0 %    MCV 91 80 - 100 fL    MCH 28.4 26.0 - 34.0 pg    MCHC 31.3 (L) 32.0 - 36.0 g/dL    RDW 14.6 (H) 11.5 - 14.5 %    Platelets 291 150 - 450 x10*3/uL                            Assessment/Plan   Assessment & Plan  Postoperative or surgical complication    Osteoarthritis of right shoulder, unspecified osteoarthritis type    Doing well postoperative day #1 revision total shoulder arthroplasty.    PT/OT  Compression sleeve for the right upper extremity to help with swelling.  Probable discharge home later today.  Follow-up my office in 2 to 3 weeks.           Jhony Nieto MD

## 2024-10-24 NOTE — CARE PLAN
The patient's goals for the shift include  rest     The clinical goals for the shift include pain control, increase mobility    Over the shift, the patient did not make progress toward the following goals. Barriers to progression include . Recommendations to address these barriers include .

## 2024-10-24 NOTE — PROGRESS NOTES
Physical Therapy    Physical Therapy Evaluation    Patient Name: Paco Goss  MRN: [id number]  Today's Date: 10/24/2024   Time Calculation  Start Time: 0915  Stop Time: 0928  Time Calculation (min): 13 min  4127/4127-A       Assessment/Plan  PT Assessment  PT Assessment Results: Decreased mobility  Rehab Prognosis: Good  Evaluation/Treatment Tolerance: Patient tolerated treatment well  Medical Staff Made Aware: Yes  End of Session Communication: Bedside nurse  Assessment Comment: Pt is a 74 y.o. male admitted for Arthritis of right shoulder region [M19.011]  Presence of right artificial shoulder joint [Z96.611]  Osteoarthritis of right shoulder, unspecified osteoarthritis type [M19.011] and is s/p reverse right shoulder arthroplasty revision on 10/23/2024. Pt below functional level and will benefit from skilled therapy during stay to improve overall functional mobility, strength, ROM, endurance and safety awareness. Upon discharge pt will benefit from low intensity therapy for continued improvement in functional mobility. Therapy will continue to follow and reassess each session.         End of Session Patient Position: Up in chair, Alarm on  IP OR SWING BED PT PLAN  Inpatient or Swing Bed: Inpatient  PT Plan  PT Plan: PT Eval only  PT Eval Only Reason: Safe to return home  PT Frequency: PT eval only  PT Discharge Recommendations: Low intensity level of continued care  PT Recommended Transfer Status: Stand by assist  PT - OK to Discharge: Yes        Subjective  Current Problem:  1. 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    Hypoglycemia    Enthesopathy of hip region    Localized osteoarthrosis    Lumbago    Primary localized osteoarthrosis of shoulder region    Morbid obesity (Multi)    Nicotine use disorder    Obesity, morbid, BMI 40.0-49.9 (Multi)    Patellar tendonitis    Renal stone    Presence of left artificial knee joint    Sacroiliac joint pain    Tinnitus of left ear    Vitamin D deficiency    Lightheadedness    Disorientation    Postural dizziness with presyncope    Arthritis of right shoulder region            General Visit Information:  General  Reason for Referral: impaired mobility  Referred By: Jhony Nieto MD  Past Medical History Relevant to Rehab: HTN, HLD, OA, Gout, hypoglycemia  Co-Treatment: OT  Co-Treatment Reason: patient safety  Prior to Session Communication: Bedside nurse  Patient Position Received: chair  General Comment: Pt agreeable to therapy     Home Living:  Home Living  Type of Home: House  Lives With: Spouse  Home Layout: One level  Home Access: Stairs to enter with rails  Entrance Stairs-Rails: Right  Entrance Stairs-Number of Steps: 2  Bathroom Shower/Tub: Walk-in shower  Bathroom Equipment: Grab bars in shower, Shower chair with back     Prior Level of Function:  Prior Function Per Pt/Caregiver Report  Level of [place]: Independent with ADLs and functional transfers, Independent with homemaking with ambulation     Precautions:  Precautions  Precautions Comment: Fall Precautions, (R) UE distal AROM, no proximal AROM, NWBing; sling and swath donned throughout     Vital Signs:     Objective  Pain:  Pain Assessment  Pain Assessment: 0-10  0-10 (Numeric) Pain Score: 4     Cognition:  Cognition  Overall Cognitive Status: Within Functional Limits     General Assessments:      Activity Tolerance  Endurance: Tolerates 10 - 20 min exercise with multiple rests  Sensation  Sensation  Comment: declines numbness/tingling  Static Sitting Balance  Static Sitting-Comment/Number of Minutes: good  Dynamic Sitting Balance  Dynamic Sitting-Comments: good  Static Standing Balance  Static Standing-Comment/Number of Minutes: good  Dynamic Standing Balance  Dynamic Standing-Comments: good     Functional Assessments:  Transfers  Transfer: Yes  Transfer 1  Transfer From 1: Chair to  Transfer to 1: Stand  Technique 1: Sit to stand, Stand to sit  Transfer Level of Assistance 1: Close supervision  Ambulation/Gait Training  Ambulation/Gait Training Performed: Yes  Ambulation/Gait Training 1  Surface 1: Level tile  Device 1:  none  Assistance 1: Contact guard  Quality of Gait 1:  (wide stance)  Comments/Distance (ft) 1: 200  Stairs  Stairs: Yes  Stairs  Rails 1: Left  Device 1: Railing  Assistance 1: close supervision  Comment/Number of Steps 1: 3     Extremity/Trunk Assessments:  RLE   RLE : Within Functional Limits  LLE   LLE : Within Functional Limits     Outcome Measures:  [facility] Basic Mobility  Turning from your back to your side while in a flat bed without using bedrails: A little  Moving from lying on your back to sitting on the side of a flat bed without using bedrails: A little  Moving to and from bed to chair (including a wheelchair): A little  Standing up from a chair using your arms (e.g. wheelchair or bedside chair): A little  To walk in hospital room: A little  Climbing 3-5 steps with railing: A little  Basic Mobility - Total Score: 18

## 2024-10-24 NOTE — DISCHARGE SUMMARY
Discharge Diagnosis  Osteoarthritis of right shoulder, unspecified osteoarthritis type    Discharge summary      This patient Paco Goss was admitted to the hospital on 10/23/2024  after undergoing Procedure(s) (LRB):  Right revision total shoulder arthroplasty. (Right) without complications.      No significant or unexpected findings or abnormal ortho imaging were noted during the hospital stay    Hospital course      Patient tolerated surgical procedure well and there was no complications. Patient progressed adequately through their recovery during hospital stay including PT and rehabilitation.    Patient was then D/C on 10/24/2024 to home  in stable condition.  Patient was instructed on the use of pain medications, the signs and symptoms of infection, and was given our number to call should they have any questions or concerns following discharge.    Based on my clinical judgment, the patient was provided with a 7-day prescription for opioid medication at 30 MED, indicated for treatment of acute pain in the setting of recent revision right TSA. OARRS report was run and has demonstrated an appropriate time course.  The patient has been provided with counseling pertaining to safe use of opioid medication.    Pertinent Physical Exam At Time of Discharge  Alert, oriented x 3, cooperative with examination.     Examination of the right upper  extremity reveals right shoulder  dressing is intact without drainage.  Right upper extremity light touch sensation is intact, right wrist, hand, fingers motor function intact, radial pulse 2+/2 palpable    Home Medications     Medication List      CHANGE how you take these medications     aspirin 81 mg EC tablet; Take 1 tablet (81 mg) by mouth 2 times a day.   For 30 days, then resume your usual aspirin 81mg daily; What changed: when   to take this, additional instructions   docusate sodium 100 mg capsule; Commonly known as: Colace; Take 1   capsule (100 mg) by mouth 2 times a day  for 10 days. Stop taking this   medication if you have diarrhea; What changed: when to take this, reasons   to take this     CONTINUE taking these medications     ascorbic acid 500 mg tablet; Commonly known as: Vitamin C   atorvastatin 80 mg tablet; Commonly known as: Lipitor; Take 1 tablet (80   mg) by mouth once daily at bedtime.   calcium carbonate 500 mg calcium (1,250 mg) tablet; Commonly known as:   Oscal   cholecalciferol 25 MCG (1000 UT) capsule; Commonly known as: Vitamin D-3   cyanocobalamin 100 mcg tablet; Commonly known as: Vitamin B-12   Flomax 0.4 mg 24 hr capsule; Generic drug: tamsulosin   multivitamin with minerals tablet   OneTouch Ultra2 Meter misc; Generic drug: blood-glucose meter   OneTouch UltraSoft Lancets misc; Generic drug: lancets   oxyCODONE 5 mg immediate release tablet; Commonly known as: Roxicodone;   Take 1 tablet (5 mg) by mouth every 6 hours if needed for moderate pain (4   - 6) or severe pain (7 - 10) for up to 7 days.     STOP taking these medications     OneTouch Ultra Test strip; Generic drug: blood sugar diagnostic           Patient may not bear weight to operative extremity with use of walker for assistance with ambulation   Surgical dressing to be removed pod7 and new dressing placed right shoulder incision and remove after 7 days  Aspirin 81 mg twice a day for DVT prophylaxis started on 10/24 and to be taken for 30 days  Follow up with surgeon in 2 weeks    Radiology images FL fluoro images no charge    Result Date: 10/23/2024  These images are not reportable by radiology and will not be interpreted by  Radiologists.       Outpatient Follow-Up  Future Appointments   Date Time Provider Department Center   1/21/2025  2:45 PM Brant Michaud MD [id number]           Alexa Baig PA-C

## 2024-10-24 NOTE — PROGRESS NOTES
10/24/24 1031   Discharge Planning   Living Arrangements Spouse/significant other   Support Systems Spouse/significant other   Type of Residence Private residence   Home or Post Acute Services None   Expected Discharge Disposition Home   Does the patient need discharge transport arranged? Yes       Met with patient and spouse at bedside. Admitted for R shoulder revision. Original surgery was 2 weeks ago. Pt lives with spouse and was independent PTA with no HHC or DME. Shoulder sling in place. PCP is Brant Smith. Pt feels he is able to manage his health and understands his medications. Was able to drive and obtain meds. Therapy evals pending. Pt plans to return home with no new discharge needs. Family will provide transport home.

## 2024-10-24 NOTE — CARE PLAN
The patient's goals for the shift include      The clinical goals for the shift include pain control & to d/c pt    Pt to discharge home safely     12:30 Pt discharge complete. Pt & pt's wife voiced understanding. Iv removed. Pt sent home with a new mepilex to change out on day 7. Pt awaiting wheelchair for ride down to the car

## 2024-10-24 NOTE — PROGRESS NOTES
Spiritual Care Visit    Clinical Encounter Type  Visited With: Patient and family together  Routine Visit: Introduction     Anointed both the patient and his wife         Sacramental Encounters  Sacrament of Sick-Anointing: Anointed                             Taxonomy  Intended Effects: Build relationship of care and support, Demonstrate caring and concern, Establish rapport and connectedness, Preserve dignity and respect  Methods: Assist with spiritual/Restorationism practices, Offer spiritual/Restorationism support  Interventions: Perform a Restorationism rite or ritual, Share words of hope and inspiration

## 2024-10-27 LAB
BACTERIA SPEC CULT: ABNORMAL
BACTERIA SPEC CULT: NORMAL
GRAM STN SPEC: ABNORMAL
GRAM STN SPEC: ABNORMAL
GRAM STN SPEC: NORMAL
GRAM STN SPEC: NORMAL

## 2025-01-21 ENCOUNTER — APPOINTMENT (OUTPATIENT)
Dept: CARDIOLOGY | Facility: CLINIC | Age: 75
End: 2025-01-21
Payer: MEDICARE

## 2025-02-26 NOTE — PROGRESS NOTES
Chief Complaint:   No chief complaint on file.     History Of Present Illness:    Paco Goss is a 74 y.o. male with a history of hypertension, dyslipidemia, abnormal ECG (inferior infarct), lightheadedness, and CAD (PCI of RCA 7/16/19 for angina) being evaluated for routine follow-up.     Had his right shoulder surgery but unfortunately it failed and he needed a second surgery.  There then was an infection which is starting to clear up.  His range of motion is limited but he is hoping that he will still have some increase in range of motion with continued physical therapy.    During the time of his recovery he was not as active.  He is finally starting to get back into the pool more.  His legs were more swollen when he was more sedentary.  He is found that his leg swelling is improved by becoming more active.  He is also hopeful that as the weather improves that he will be able to get outside more.  He is still open to golf this summer.    He denies any chest pain, palpitations or exertional shortness of breath.    Lexiscan nuclear stress test 7/30/2024: No evidence of ischemia or scar.  Apical defect likely apical thinning artifact rather than scar.  EF 61%.     2-week ambulatory monitor February 2023: Predominantly sinus rhythm. No evidence of atrial fibrillation or flutter. Multiple episodes of SVT (likely atrial tachycardia) with the longest episode lasting 2 minutes and 10 seconds and the fastest only lasting 3 beats at a rate of 125 bpm. Second-degree type I AV block noted. Rare episodes of nonsustained VT.     Carotid duplex 12/21/2022: No evidence of significant stenosis. Less than 50% stenosis bilaterally. Performed for transient global amnesia.     Echocardiogram 12/21/2022: EF 60 to 65% with grade 1 diastolic dysfunction.     Echocardiogram 7/16/19 demonstrating vigorous LV function, EF 65-70%.     Catheterization 7/16/19 via the right radial artery. PCI to the ostial RCA with a 5.0 x 12 mm Resolute Javi  CARMINA. 99% stenosis down to 0%. Mid right PLV with 80% stenosis. Decided to treat medically. The mid to distal LAD also had a 60% stenosis.      Past Medical History:  He has a past medical history of Abnormal result of other cardiovascular function study (07/29/2019), BPH (benign prostatic hyperplasia), Circadian rhythm sleep disorder, unspecified type, Coronary artery disease, Gout, Hyperlipidemia, Hypertension, Hypoglycemia, Kidney stones, OA (osteoarthritis), Other forms of angina pectoris (07/29/2019), Personal history of other diseases of the circulatory system (07/29/2019), Personal history of other diseases of the musculoskeletal system and connective tissue, Personal history of other specified conditions (07/29/2019), and Personal history of other specified conditions (07/29/2019).    Past Surgical History:  He has a past surgical history that includes Other surgical history (07/29/2019); Other surgical history (07/29/2019); Other surgical history (07/29/2019); Other surgical history (07/29/2019); Total hip arthroplasty (Right, 2020); Total knee arthroplasty (Left, 2015); Back surgery; and Shoulder arthroscopy (Right, 10/23/2024).      Social History:  He reports that he has been smoking cigars. He has never used smokeless tobacco. He reports current alcohol use. He reports that he does not use drugs.    Family History:  Family History   Problem Relation Name Age of Onset    Diabetes Father          Allergies:  Cat hair standardized allergenic extract, Ciprofloxacin, Oxycodone, and Sulfa (sulfonamide antibiotics)    Outpatient Medications:  Current Outpatient Medications   Medication Instructions    ascorbic acid (VITAMIN C) 500 mg, Daily    atorvastatin (LIPITOR) 80 mg, oral, Nightly    calcium carbonate (Oscal) 500 mg calcium (1,250 mg) tablet 1 tablet, Daily    cholecalciferol (VITAMIN D-3) 25 mcg, Daily    cyanocobalamin (VITAMIN B-12) 100 mcg, Daily    Flomax 0.4 mg, Nightly    glucosamine-chondroitin  "500-400 mg tablet 1 tablet, 2 times daily    magnesium lactate CR (MAGTAB) 84 mg, Daily    multivitamin with minerals (multivit-min-iron fum-folic ac) tablet 1 tablet, Daily       Last Recorded Vitals:  Visit Vitals  /78 (BP Location: Right arm, Patient Position: Sitting)   Pulse 77   Ht 1.778 m (5' 10\")   Wt (!) 153 kg (337 lb)   SpO2 94%   BMI 48.35 kg/m²   Smoking Status Some Days   BSA 2.75 m²      LASTWT(3):   Wt Readings from Last 3 Encounters:   02/27/25 (!) 153 kg (337 lb)   10/23/24 (!) 154 kg (340 lb)   10/09/24 (!) 151 kg (332 lb)       Physical Exam:  In general: alert and in no acute distress.   HEENT: Carotid upstrokes normal with no bruits. JVP is normal.   Pulmonary: Clear to auscultation bilaterally.  Cardiovascular: S1,S2, regular. No appreciable murmurs, rubs or gallops.   Lower extremities: Warm. 2+ distal pulses.  Trivial to 1+ bipedal edema.     Last Labs:  CBC -  Recent Labs     10/24/24  0431 10/23/24  0958 10/10/24  0440   WBC 14.0* 9.1 9.7   HGB 11.2* 11.8* 11.9*   HCT 35.8* 37.6* 38.6*    268 170   MCV 91 90 93       CMP -  Recent Labs     10/24/24  0431 10/23/24  0958 10/10/24  0440 08/27/20  0529 07/17/19  1026 07/16/19  0445 07/15/19  1525    139 136   < > 138 138 138   K 4.2 4.3 4.3   < > 4.0 4.1 4.1    106 104   < > 106 107 104   CO2 25 27 24   < > 24 23 25   ANIONGAP 13 10 12   < > 12 12 13   BUN 16 15 16   < > 14 19 25*   CREATININE 0.72 0.73 0.79   < > 0.65 0.66 0.76   EGFR >90 >90 >90   < >  --   --   --    MG  --   --   --   --  2.20 2.20 2.30    < > = values in this interval not displayed.     Recent Labs     10/23/24  0958   ALBUMIN 3.5   ALKPHOS 41   ALT 28   AST 23   BILITOT 0.6       LIPID PANEL -   Recent Labs     07/16/19  0445   CHOL 169   LDLF 82   HDL 29.0*   TRIG 292*     Lipid panel 6/6/2024: Total cholesterol 124, HDL 45, LDL 61, and triglycerides 95.    No results for input(s): \"BNP\", \"HGBA1C\" in the last 55142 hours.      Assessment/Plan "   1) CAD: PCI of RCA 7/16/19 for angina.  No recent exertional chest pain or shortness of breath.  Based on the ISCHEMIA Trial no need for routine stress testing.    2) dyslipidemia: Continue with high intensity atorvastatin.  Most recent lipid panel with LDL less than 70.     3) hypertension: Blood pressure well controlled.     4) paroxysmal SVT: No recent complaints of palpitations.  Continue to observe.     5) leg swelling: Likely secondary to chronic venous insufficiency.  Improving with increased mobility.  Told him that we can always prescribe a low-dose of furosemide if his leg swelling persists but we all agree (the patient myself and his wife) to try to hold off on any new medications if not absolutely necessary.    6) follow-up: 6 months or sooner if needed.       Brant Michaud MD

## 2025-02-27 ENCOUNTER — APPOINTMENT (OUTPATIENT)
Dept: CARDIOLOGY | Facility: CLINIC | Age: 75
End: 2025-02-27
Payer: MEDICARE

## 2025-02-27 VITALS
WEIGHT: 315 LBS | OXYGEN SATURATION: 94 % | BODY MASS INDEX: 45.1 KG/M2 | DIASTOLIC BLOOD PRESSURE: 78 MMHG | HEART RATE: 77 BPM | HEIGHT: 70 IN | SYSTOLIC BLOOD PRESSURE: 142 MMHG

## 2025-02-27 DIAGNOSIS — E78.5 DYSLIPIDEMIA: ICD-10-CM

## 2025-02-27 DIAGNOSIS — I25.10 ATHSCL HEART DISEASE OF NATIVE CORONARY ARTERY W/O ANG PCTRS: Primary | ICD-10-CM

## 2025-02-27 DIAGNOSIS — I10 BENIGN ESSENTIAL HYPERTENSION: ICD-10-CM

## 2025-02-27 PROCEDURE — 99214 OFFICE O/P EST MOD 30 MIN: CPT | Performed by: INTERNAL MEDICINE

## 2025-02-27 PROCEDURE — 3077F SYST BP >= 140 MM HG: CPT | Performed by: INTERNAL MEDICINE

## 2025-02-27 PROCEDURE — 3078F DIAST BP <80 MM HG: CPT | Performed by: INTERNAL MEDICINE

## 2025-02-27 PROCEDURE — 1160F RVW MEDS BY RX/DR IN RCRD: CPT | Performed by: INTERNAL MEDICINE

## 2025-02-27 PROCEDURE — 1159F MED LIST DOCD IN RCRD: CPT | Performed by: INTERNAL MEDICINE

## 2025-02-27 RX ORDER — MAGNESIUM L-LACTATE 84 MG
84 TABLET, EXTENDED RELEASE ORAL DAILY
COMMUNITY

## 2025-02-27 RX ORDER — GLUCOSAMINE/CHONDRO SU A 500-400 MG
1 TABLET ORAL 2 TIMES DAILY
COMMUNITY

## 2025-03-10 ENCOUNTER — TRANSCRIBE ORDERS (OUTPATIENT)
Dept: ADMINISTRATIVE | Age: 75
End: 2025-03-10

## 2025-03-10 DIAGNOSIS — G45.4 TRANSIENT GLOBAL AMNESIA: ICD-10-CM

## 2025-03-10 DIAGNOSIS — G45.9 TIA (TRANSIENT ISCHEMIC ATTACK): Primary | ICD-10-CM

## 2025-04-07 ENCOUNTER — TELEPHONE (OUTPATIENT)
Dept: CARDIOLOGY | Facility: CLINIC | Age: 75
End: 2025-04-07
Payer: MEDICARE

## 2025-04-07 NOTE — TELEPHONE ENCOUNTER
Patient called concerning a skin rash all over his body he read that it could be a side effect from the atorvastatin, asked about considering another medication. Did see pcp concerning rash, no found cause of rash pcp provided triamcinolone acetonide cream for the rash.

## 2025-04-07 NOTE — TELEPHONE ENCOUNTER
FYI Spoke to patient. He states it is a raised rash that has been popping up on different areas of his upper extremities for the last few months. He thinks it is the atorvastatin as rash is a listed side effect. Please advise.

## 2025-04-11 ENCOUNTER — HOSPITAL ENCOUNTER (OUTPATIENT)
Dept: MRI IMAGING | Age: 75
Discharge: HOME | End: 2025-04-13
Payer: MEDICARE

## 2025-04-11 DIAGNOSIS — G45.9 TIA (TRANSIENT ISCHEMIC ATTACK): ICD-10-CM

## 2025-04-11 DIAGNOSIS — G45.4 TRANSIENT GLOBAL AMNESIA: ICD-10-CM

## 2025-04-11 PROCEDURE — 70551 MRI BRAIN STEM W/O DYE: CPT

## 2025-08-31 DIAGNOSIS — I10 HYPERTENSION, UNSPECIFIED TYPE: ICD-10-CM

## 2025-09-02 ENCOUNTER — OFFICE VISIT (OUTPATIENT)
Dept: CARDIOLOGY | Facility: CLINIC | Age: 75
End: 2025-09-02
Payer: MEDICARE

## 2025-09-02 VITALS
DIASTOLIC BLOOD PRESSURE: 78 MMHG | WEIGHT: 315 LBS | BODY MASS INDEX: 45.1 KG/M2 | SYSTOLIC BLOOD PRESSURE: 128 MMHG | HEART RATE: 70 BPM | OXYGEN SATURATION: 95 % | HEIGHT: 70 IN

## 2025-09-02 DIAGNOSIS — E78.5 DYSLIPIDEMIA: ICD-10-CM

## 2025-09-02 DIAGNOSIS — I47.10 PAROXYSMAL SVT (SUPRAVENTRICULAR TACHYCARDIA): ICD-10-CM

## 2025-09-02 DIAGNOSIS — E66.01 MORBID OBESITY (MULTI): ICD-10-CM

## 2025-09-02 DIAGNOSIS — I10 BENIGN ESSENTIAL HYPERTENSION: ICD-10-CM

## 2025-09-02 DIAGNOSIS — I25.10 ATHSCL HEART DISEASE OF NATIVE CORONARY ARTERY W/O ANG PCTRS: Primary | ICD-10-CM

## 2025-09-02 PROCEDURE — 3078F DIAST BP <80 MM HG: CPT | Performed by: INTERNAL MEDICINE

## 2025-09-02 PROCEDURE — 1036F TOBACCO NON-USER: CPT | Performed by: INTERNAL MEDICINE

## 2025-09-02 PROCEDURE — 1160F RVW MEDS BY RX/DR IN RCRD: CPT | Performed by: INTERNAL MEDICINE

## 2025-09-02 PROCEDURE — 3074F SYST BP LT 130 MM HG: CPT | Performed by: INTERNAL MEDICINE

## 2025-09-02 PROCEDURE — 93010 ELECTROCARDIOGRAM REPORT: CPT | Performed by: INTERNAL MEDICINE

## 2025-09-02 PROCEDURE — 99212 OFFICE O/P EST SF 10 MIN: CPT | Mod: 25

## 2025-09-02 PROCEDURE — 1159F MED LIST DOCD IN RCRD: CPT | Performed by: INTERNAL MEDICINE

## 2025-09-02 PROCEDURE — 93005 ELECTROCARDIOGRAM TRACING: CPT | Performed by: INTERNAL MEDICINE

## 2025-09-02 PROCEDURE — 99214 OFFICE O/P EST MOD 30 MIN: CPT | Performed by: INTERNAL MEDICINE

## 2025-09-02 RX ORDER — ATORVASTATIN CALCIUM 80 MG/1
80 TABLET, FILM COATED ORAL NIGHTLY
Qty: 30 TABLET | Refills: 11 | Status: SHIPPED | OUTPATIENT
Start: 2025-09-02

## 2025-09-02 RX ORDER — TRIAMCINOLONE ACETONIDE 1 MG/G
CREAM TOPICAL
COMMUNITY
Start: 2025-01-21

## 2025-09-02 RX ORDER — TIRZEPATIDE 2.5 MG/.5ML
2.5 INJECTION, SOLUTION SUBCUTANEOUS
COMMUNITY
Start: 2025-08-14 | End: 2025-10-02

## 2025-09-02 RX ORDER — BIOTIN 10 MG
TABLET ORAL
COMMUNITY
Start: 2025-04-07

## 2025-09-02 RX ORDER — DOCUSATE SODIUM 100 MG/1
CAPSULE, LIQUID FILLED ORAL AS NEEDED
COMMUNITY

## (undated) DEVICE — TIP, SUCTION, YANKAUER, FLEXIBLE

## (undated) DEVICE — DRESSING, NON-ADHERENT, 3 X 3 IN, STERILE

## (undated) DEVICE — TOWEL PACK, STERILE, 4/PACK, BLUE

## (undated) DEVICE — SUTURE, ETHIBOND, 2, CUSTOM, GREEN/WHITE

## (undated) DEVICE — IRRIGATION SYSTEM, WOUND, SURGIPHOR, 450ML, STERILE

## (undated) DEVICE — SPONGE, LAP, XRAY DECT, 18IN X 18IN, W/MASTER DMT, STERILE

## (undated) DEVICE — BLADE, SAGITTAL, THIN, SHORT, LF

## (undated) DEVICE — COVER, TABLE, 54X90

## (undated) DEVICE — ADHESIVE, SKIN, DERMABOND ADVANCED, 15CM, PEN-STYLE

## (undated) DEVICE — WOUND SYSTEM, DEBRIDEMENT & CLEANING, O.R DUOPAK

## (undated) DEVICE — DRESSING, GAUZE, SUPER KERLIX, 6X6

## (undated) DEVICE — Device

## (undated) DEVICE — DRESSING, ABDOMINAL, WET PRUF, TENDERSORB, 5 X 9 IN, STERILE

## (undated) DEVICE — APPLICATOR, CHLORAPREP, W/ORANGE TINT, 26ML

## (undated) DEVICE — GLOVE, SURGICAL, PROTEXIS PI , 7.5, PF, LF

## (undated) DEVICE — GLOVE, SURGICAL, PROTEXIS PI MICRO, 7.5, PF, LF

## (undated) DEVICE — MIXER, CEMENT, MIXEVAC III HIGH VACUUM KIT, STERILE

## (undated) DEVICE — DRAPE, SHEET, U, STERI DRAPE, 47 X 51 IN, DISPOSABLE, STERILE

## (undated) DEVICE — KIT, BEACH CHAIR TRIMANO

## (undated) DEVICE — SOLUTION, IRRIGATION, SODIUM CHLORIDE 0.9%, 1000 ML, POUR BOTTLE

## (undated) DEVICE — HEAD POSITIONER, UNIVERSAL, DISP

## (undated) DEVICE — DRESSING, MEPILEX BORDER, POST-OP AG, 4 X 12 IN

## (undated) DEVICE — TAPE, SURGICAL, FOAM, MICROFOAM, HYPOALLERGENIC, 3 IN X 5.5 YD

## (undated) DEVICE — DRESSING, MEPILEX BORDER, POST-OP AG, 4 X 10 IN

## (undated) DEVICE — DRAPE, INSTRUMENT, W/POUCH, STERI DRAPE, 7 X 11 IN, DISPOSABLE, STERILE

## (undated) DEVICE — SUTURE, STRATAFIX, SPIRAL MONOCRYL PLUS, 3-0, PS-2 45CM, UNDYED

## (undated) DEVICE — SUTURE, CTD, VICRYL, 2-0, UND, BR, CT-2

## (undated) DEVICE — DRAPE, SHEET, THREE QUARTER, FAN FOLD, 57 X 77 IN

## (undated) DEVICE — ORTHOCORD P2, VIOLET/BLUE, W/NEEDLES

## (undated) DEVICE — SOLUTION, IRRIGATION, STERILE WATER, 1000 ML, POUR BOTTLE

## (undated) DEVICE — PREP KIT, BONE, BIO-PREP